# Patient Record
Sex: MALE | Race: WHITE | NOT HISPANIC OR LATINO | Employment: OTHER | URBAN - METROPOLITAN AREA
[De-identification: names, ages, dates, MRNs, and addresses within clinical notes are randomized per-mention and may not be internally consistent; named-entity substitution may affect disease eponyms.]

---

## 2018-04-03 ENCOUNTER — TELEPHONE (OUTPATIENT)
Dept: PRIMARY CARE CLINIC | Facility: CLINIC | Age: 63
End: 2018-04-03

## 2018-04-03 NOTE — TELEPHONE ENCOUNTER
----- Message from Dana Chaudhry sent at 4/3/2018  1:39 PM CDT -----  Patient is calling to get advice from doctor concerning a hernia that he has. Please call back to advise at [phone].

## 2019-08-29 ENCOUNTER — TELEPHONE (OUTPATIENT)
Dept: PRIMARY CARE CLINIC | Facility: CLINIC | Age: 64
End: 2019-08-29

## 2019-08-29 NOTE — TELEPHONE ENCOUNTER
----- Message from Erika Hernandez sent at 8/29/2019  9:51 AM CDT -----  Can we fit him in tomorrow with his wife  has appt at 215 has eye infection

## 2019-08-29 NOTE — TELEPHONE ENCOUNTER
Spoke to wife and explained that  is completely booked up on his schedule until next Tuesday. I offered an appt. Today or tomorrow on the nurse practitioners schedule but she stated the times available wouldn't work for her because she couldn't take off work that long. She will bring pt. To the urgent care.

## 2020-10-22 ENCOUNTER — TELEPHONE (OUTPATIENT)
Dept: PRIMARY CARE CLINIC | Facility: CLINIC | Age: 65
End: 2020-10-22

## 2020-10-22 NOTE — TELEPHONE ENCOUNTER
----- Message from Cele Barnhart sent at 10/22/2020  9:36 AM CDT -----  Contact: Spouse/Ofe [phone]  Patient is experiencing possible poison ivy. Requesting to speak with you.    Please call and advise.    Thank you      
----- Message from Praveena Espinoza sent at 10/22/2020  2:21 PM CDT -----  Pt would like call back regarding an appt over the phone     
Audio Visit tomorrow at 3:30 pm. Pt. spouse (Ofe) Confirmed apt.   
Spoke with Ofe bone. Spouse states pt. Was working in the garden yesterday and now has red, itchy spots all over his body. Requesting apt. For today unfortunately we do not have anything with Dr. Carter. Pt. States someone told her she had an apt. At 4 pm. I explained to her that no cheryl Mr. Elizabeth was not scheduled today with one of our providers. Scheduled Audio call for tomorrow at 3:30 pm. Per Dr. Carter states he will send in Medication to [facility].   
Universal Safety Interventions

## 2020-10-30 DIAGNOSIS — Z12.11 COLON CANCER SCREENING: ICD-10-CM

## 2021-05-20 ENCOUNTER — TELEPHONE (OUTPATIENT)
Dept: PRIMARY CARE CLINIC | Facility: CLINIC | Age: 66
End: 2021-05-20

## 2021-05-21 ENCOUNTER — OFFICE VISIT (OUTPATIENT)
Dept: PRIMARY CARE CLINIC | Facility: CLINIC | Age: 66
End: 2021-05-21
Payer: MEDICARE

## 2021-05-21 VITALS
BODY MASS INDEX: 26.29 KG/M2 | WEIGHT: 163.56 LBS | SYSTOLIC BLOOD PRESSURE: 118 MMHG | RESPIRATION RATE: 18 BRPM | HEART RATE: 61 BPM | HEIGHT: 66 IN | DIASTOLIC BLOOD PRESSURE: 66 MMHG | OXYGEN SATURATION: 95 % | TEMPERATURE: 98 F

## 2021-05-21 DIAGNOSIS — F17.210 NICOTINE DEPENDENCE, CIGARETTES, UNCOMPLICATED: ICD-10-CM

## 2021-05-21 DIAGNOSIS — M12.572 TRAUMATIC ARTHRITIS OF LEFT ANKLE: ICD-10-CM
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
## 2021-05-21 DIAGNOSIS — Z11.59 NEED FOR HEPATITIS C SCREENING TEST: ICD-10-CM

## 2021-05-21 DIAGNOSIS — R93.3 ABNORMAL COLONOSCOPY: ICD-10-CM

## 2021-05-21 DIAGNOSIS — Z76.89 ENCOUNTER TO ESTABLISH CARE: Primary | ICD-10-CM

## 2021-05-21 DIAGNOSIS — Z12.11 COLON CANCER SCREENING: ICD-10-CM

## 2021-05-21 DIAGNOSIS — Z13.6 ENCOUNTER FOR SCREENING FOR CARDIOVASCULAR DISORDERS: ICD-10-CM

## 2021-05-21 DIAGNOSIS — Z87.19 HISTORY OF RECTAL BLEEDING: ICD-10-CM

## 2021-05-21 DIAGNOSIS — S50.869A INSECT BITE OF FOREARM, UNSPECIFIED LATERALITY, INITIAL ENCOUNTER: ICD-10-CM

## 2021-05-21 PROCEDURE — 1101F PR PT FALLS ASSESS DOC 0-1 FALLS W/OUT INJ PAST YR: ICD-10-PCS | Mod: S$GLB,,, | Performed by: STUDENT IN AN ORGANIZED HEALTH CARE EDUCATION/TRAINING PROGRAM

## 2021-05-21 PROCEDURE — 99999 PR PBB SHADOW E&M-EST. PATIENT-LVL V: CPT | Mod: PBBFAC,,, | Performed by: STUDENT IN AN ORGANIZED HEALTH CARE EDUCATION/TRAINING PROGRAM

## 2021-05-21 PROCEDURE — 3288F FALL RISK ASSESSMENT DOCD: CPT | Mod: S$GLB,,, | Performed by: STUDENT IN AN ORGANIZED HEALTH CARE EDUCATION/TRAINING PROGRAM

## 2021-05-21 PROCEDURE — 1101F PT FALLS ASSESS-DOCD LE1/YR: CPT | Mod: S$GLB,,, | Performed by: STUDENT IN AN ORGANIZED HEALTH CARE EDUCATION/TRAINING PROGRAM

## 2021-05-21 PROCEDURE — 1126F AMNT PAIN NOTED NONE PRSNT: CPT | Mod: S$GLB,,, | Performed by: STUDENT IN AN ORGANIZED HEALTH CARE EDUCATION/TRAINING PROGRAM

## 2021-05-21 PROCEDURE — 1126F PR PAIN SEVERITY QUANTIFIED, NO PAIN PRESENT: ICD-10-PCS | Mod: S$GLB,,, | Performed by: STUDENT IN AN ORGANIZED HEALTH CARE EDUCATION/TRAINING PROGRAM

## 2021-05-21 PROCEDURE — 3008F PR BODY MASS INDEX (BMI) DOCUMENTED: ICD-10-PCS | Mod: S$GLB,,, | Performed by: STUDENT IN AN ORGANIZED HEALTH CARE EDUCATION/TRAINING PROGRAM

## 2021-05-21 PROCEDURE — 99999 PR PBB SHADOW E&M-EST. PATIENT-LVL V: ICD-10-PCS | Mod: PBBFAC,,, | Performed by: STUDENT IN AN ORGANIZED HEALTH CARE EDUCATION/TRAINING PROGRAM

## 2021-05-21 PROCEDURE — 99204 OFFICE O/P NEW MOD 45 MIN: CPT | Mod: S$GLB,,, | Performed by: STUDENT IN AN ORGANIZED HEALTH CARE EDUCATION/TRAINING PROGRAM

## 2021-05-21 PROCEDURE — 1159F PR MEDICATION LIST DOCUMENTED IN MEDICAL RECORD: ICD-10-PCS | Mod: S$GLB,,, | Performed by: STUDENT IN AN ORGANIZED HEALTH CARE EDUCATION/TRAINING PROGRAM

## 2021-05-21 PROCEDURE — 3008F BODY MASS INDEX DOCD: CPT | Mod: S$GLB,,, | Performed by: STUDENT IN AN ORGANIZED HEALTH CARE EDUCATION/TRAINING PROGRAM

## 2021-05-21 PROCEDURE — 99204 PR OFFICE/OUTPT VISIT, NEW, LEVL IV, 45-59 MIN: ICD-10-PCS | Mod: S$GLB,,, | Performed by: STUDENT IN AN ORGANIZED HEALTH CARE EDUCATION/TRAINING PROGRAM

## 2021-05-21 PROCEDURE — 1159F MED LIST DOCD IN RCRD: CPT | Mod: S$GLB,,, | Performed by: STUDENT IN AN ORGANIZED HEALTH CARE EDUCATION/TRAINING PROGRAM

## 2021-05-21 PROCEDURE — 3288F PR FALLS RISK ASSESSMENT DOCUMENTED: ICD-10-PCS | Mod: S$GLB,,, | Performed by: STUDENT IN AN ORGANIZED HEALTH CARE EDUCATION/TRAINING PROGRAM

## 2021-05-21 RX ORDER — HYDROCORTISONE 25 MG/G
CREAM TOPICAL 2 TIMES DAILY
Qty: 20 G | Refills: 3 | Status: SHIPPED | OUTPATIENT
Start: 2021-05-21 | End: 2022-01-18

## 2021-05-24 ENCOUNTER — TELEPHONE (OUTPATIENT)
Dept: SURGERY | Facility: CLINIC | Age: 66
End: 2021-05-24

## 2021-06-04 ENCOUNTER — TELEPHONE (OUTPATIENT)
Dept: ENDOSCOPY | Facility: HOSPITAL | Age: 66
End: 2021-06-04

## 2021-06-04 ENCOUNTER — TELEPHONE (OUTPATIENT)
Dept: PRIMARY CARE CLINIC | Facility: CLINIC | Age: 66
End: 2021-06-04

## 2021-06-04 ENCOUNTER — TELEPHONE (OUTPATIENT)
Dept: SURGERY | Facility: CLINIC | Age: 66
End: 2021-06-04

## 2021-06-04 ENCOUNTER — TELEPHONE (OUTPATIENT)
Dept: GASTROENTEROLOGY | Facility: CLINIC | Age: 66
End: 2021-06-04

## 2021-06-04 DIAGNOSIS — Z12.11 SCREENING FOR COLON CANCER: Primary | ICD-10-CM

## 2021-06-04 RX ORDER — SODIUM CHLORIDE, SODIUM LACTATE, POTASSIUM CHLORIDE, CALCIUM CHLORIDE 600; 310; 30; 20 MG/100ML; MG/100ML; MG/100ML; MG/100ML
INJECTION, SOLUTION INTRAVENOUS CONTINUOUS
Status: CANCELLED | OUTPATIENT
Start: 2021-06-04

## 2021-06-04 RX ORDER — SODIUM, POTASSIUM,MAG SULFATES 17.5-3.13G
1 SOLUTION, RECONSTITUTED, ORAL ORAL DAILY
Qty: 1 KIT | Refills: 0 | Status: SHIPPED | OUTPATIENT
Start: 2021-06-04 | End: 2021-06-06

## 2021-06-04 RX ORDER — SODIUM CHLORIDE 0.9 % (FLUSH) 0.9 %
3 SYRINGE (ML) INJECTION
Status: CANCELLED | OUTPATIENT
Start: 2021-06-04

## 2021-06-07 ENCOUNTER — TELEPHONE (OUTPATIENT)
Dept: ENDOSCOPY | Facility: HOSPITAL | Age: 66
End: 2021-06-07

## 2021-06-08 DIAGNOSIS — Z12.11 COLON CANCER SCREENING: Primary | ICD-10-CM

## 2021-06-14 ENCOUNTER — TELEPHONE (OUTPATIENT)
Dept: SURGERY | Facility: CLINIC | Age: 66
End: 2021-06-14

## 2021-07-06 ENCOUNTER — ANESTHESIA (OUTPATIENT)
Dept: ENDOSCOPY | Facility: HOSPITAL | Age: 66
End: 2021-07-06
Payer: MEDICARE

## 2021-07-06 ENCOUNTER — ANESTHESIA EVENT (OUTPATIENT)
Dept: ENDOSCOPY | Facility: HOSPITAL | Age: 66
End: 2021-07-06
Payer: MEDICARE

## 2021-07-06 ENCOUNTER — TELEPHONE (OUTPATIENT)
Dept: SURGERY | Facility: CLINIC | Age: 66
End: 2021-07-06

## 2021-07-06 ENCOUNTER — HOSPITAL ENCOUNTER (OUTPATIENT)
Facility: HOSPITAL | Age: 66
Discharge: HOME | End: 2021-07-06
Attending: INTERNAL MEDICINE | Admitting: INTERNAL MEDICINE
Payer: MEDICARE

## 2021-07-06 VITALS
HEIGHT: 66 IN | BODY MASS INDEX: 27 KG/M2 | HEART RATE: 58 BPM | WEIGHT: 168 LBS | SYSTOLIC BLOOD PRESSURE: 134 MMHG | OXYGEN SATURATION: 100 % | DIASTOLIC BLOOD PRESSURE: 75 MMHG | TEMPERATURE: 97 F | RESPIRATION RATE: 20 BRPM

## 2021-07-06 DIAGNOSIS — Z12.11 SCREENING FOR COLON CANCER: ICD-10-CM

## 2021-07-06 PROCEDURE — G0121 COLON CA SCRN NOT HI RSK IND: HCPCS | Mod: 74 | Performed by: INTERNAL MEDICINE

## 2021-07-06 PROCEDURE — 25000003 PHARM REV CODE 250: Performed by: NURSE ANESTHETIST, CERTIFIED REGISTERED

## 2021-07-06 PROCEDURE — 25000003 PHARM REV CODE 250: Performed by: INTERNAL MEDICINE

## 2021-07-06 PROCEDURE — G0121 COLON CA SCRN NOT HI RSK IND: ICD-10-PCS | Mod: 53,,, | Performed by: INTERNAL MEDICINE

## 2021-07-06 PROCEDURE — 37000008 HC ANESTHESIA 1ST 15 MINUTES: Performed by: INTERNAL MEDICINE

## 2021-07-06 PROCEDURE — 45378 DIAGNOSTIC COLONOSCOPY: CPT | Mod: 53 | Performed by: INTERNAL MEDICINE

## 2021-07-06 PROCEDURE — G0121 COLON CA SCRN NOT HI RSK IND: HCPCS | Mod: 53,,, | Performed by: INTERNAL MEDICINE

## 2021-07-06 PROCEDURE — 37000009 HC ANESTHESIA EA ADD 15 MINS: Performed by: INTERNAL MEDICINE

## 2021-07-06 PROCEDURE — 63600175 PHARM REV CODE 636 W HCPCS: Performed by: NURSE ANESTHETIST, CERTIFIED REGISTERED

## 2021-07-06 RX ORDER — SODIUM CHLORIDE 0.9 % (FLUSH) 0.9 %
3 SYRINGE (ML) INJECTION
Status: DISCONTINUED | OUTPATIENT
Start: 2021-07-06 | End: 2021-07-06 | Stop reason: HOSPADM

## 2021-07-06 RX ORDER — SODIUM CHLORIDE, SODIUM LACTATE, POTASSIUM CHLORIDE, CALCIUM CHLORIDE 600; 310; 30; 20 MG/100ML; MG/100ML; MG/100ML; MG/100ML
INJECTION, SOLUTION INTRAVENOUS CONTINUOUS
Status: DISCONTINUED | OUTPATIENT
Start: 2021-07-06 | End: 2021-07-06 | Stop reason: HOSPADM

## 2021-07-06 RX ORDER — PROPOFOL 10 MG/ML
VIAL (ML) INTRAVENOUS CONTINUOUS PRN
Status: DISCONTINUED | OUTPATIENT
Start: 2021-07-06 | End: 2021-07-06

## 2021-07-06 RX ORDER — PROPOFOL 10 MG/ML
VIAL (ML) INTRAVENOUS
Status: DISCONTINUED | OUTPATIENT
Start: 2021-07-06 | End: 2021-07-06

## 2021-07-06 RX ORDER — LIDOCAINE HYDROCHLORIDE 20 MG/ML
INJECTION INTRAVENOUS
Status: DISCONTINUED | OUTPATIENT
Start: 2021-07-06 | End: 2021-07-06

## 2021-07-06 RX ORDER — SODIUM CHLORIDE 9 MG/ML
INJECTION, SOLUTION INTRAVENOUS CONTINUOUS
Status: DISCONTINUED | OUTPATIENT
Start: 2021-07-06 | End: 2021-07-06 | Stop reason: HOSPADM

## 2021-07-06 RX ADMIN — LIDOCAINE HYDROCHLORIDE 30 MG: 20 INJECTION, SOLUTION INTRAVENOUS at 01:07

## 2021-07-06 RX ADMIN — PROPOFOL 150 MCG/KG/MIN: 10 INJECTION, EMULSION INTRAVENOUS at 01:07

## 2021-07-06 RX ADMIN — PROPOFOL 50 MG: 10 INJECTION, EMULSION INTRAVENOUS at 01:07

## 2021-07-06 RX ADMIN — SODIUM CHLORIDE: 0.9 INJECTION, SOLUTION INTRAVENOUS at 01:07

## 2021-07-07 ENCOUNTER — TELEPHONE (OUTPATIENT)
Dept: ENDOSCOPY | Facility: HOSPITAL | Age: 66
End: 2021-07-07

## 2022-01-18 ENCOUNTER — OFFICE VISIT (OUTPATIENT)
Dept: PRIMARY CARE CLINIC | Facility: CLINIC | Age: 67
End: 2022-01-18
Payer: MEDICARE

## 2022-01-18 VITALS
WEIGHT: 159.19 LBS | HEART RATE: 84 BPM | RESPIRATION RATE: 18 BRPM | HEIGHT: 66 IN | BODY MASS INDEX: 25.58 KG/M2 | SYSTOLIC BLOOD PRESSURE: 138 MMHG | OXYGEN SATURATION: 96 % | DIASTOLIC BLOOD PRESSURE: 60 MMHG | TEMPERATURE: 98 F

## 2022-01-18 DIAGNOSIS — G56.91 NEUROPATHY, ARM, RIGHT: Primary | ICD-10-CM

## 2022-01-18 DIAGNOSIS — R07.9 CHEST PAIN, UNSPECIFIED TYPE: ICD-10-CM

## 2022-01-18 PROCEDURE — 1100F PTFALLS ASSESS-DOCD GE2>/YR: CPT | Mod: CPTII,S$GLB,, | Performed by: STUDENT IN AN ORGANIZED HEALTH CARE EDUCATION/TRAINING PROGRAM

## 2022-01-18 PROCEDURE — 99214 PR OFFICE/OUTPT VISIT, EST, LEVL IV, 30-39 MIN: ICD-10-PCS | Mod: S$GLB,,, | Performed by: STUDENT IN AN ORGANIZED HEALTH CARE EDUCATION/TRAINING PROGRAM

## 2022-01-18 PROCEDURE — 93005 EKG 12-LEAD: ICD-10-PCS | Mod: S$GLB,,, | Performed by: STUDENT IN AN ORGANIZED HEALTH CARE EDUCATION/TRAINING PROGRAM

## 2022-01-18 PROCEDURE — 3288F FALL RISK ASSESSMENT DOCD: CPT | Mod: CPTII,S$GLB,, | Performed by: STUDENT IN AN ORGANIZED HEALTH CARE EDUCATION/TRAINING PROGRAM

## 2022-01-18 PROCEDURE — 1159F MED LIST DOCD IN RCRD: CPT | Mod: CPTII,S$GLB,, | Performed by: STUDENT IN AN ORGANIZED HEALTH CARE EDUCATION/TRAINING PROGRAM

## 2022-01-18 PROCEDURE — 93010 ELECTROCARDIOGRAM REPORT: CPT | Mod: S$GLB,,, | Performed by: INTERNAL MEDICINE

## 2022-01-18 PROCEDURE — 3008F BODY MASS INDEX DOCD: CPT | Mod: CPTII,S$GLB,, | Performed by: STUDENT IN AN ORGANIZED HEALTH CARE EDUCATION/TRAINING PROGRAM

## 2022-01-18 PROCEDURE — 99214 OFFICE O/P EST MOD 30 MIN: CPT | Mod: S$GLB,,, | Performed by: STUDENT IN AN ORGANIZED HEALTH CARE EDUCATION/TRAINING PROGRAM

## 2022-01-18 PROCEDURE — 3075F PR MOST RECENT SYSTOLIC BLOOD PRESS GE 130-139MM HG: ICD-10-PCS | Mod: CPTII,S$GLB,, | Performed by: STUDENT IN AN ORGANIZED HEALTH CARE EDUCATION/TRAINING PROGRAM

## 2022-01-18 PROCEDURE — 1159F PR MEDICATION LIST DOCUMENTED IN MEDICAL RECORD: ICD-10-PCS | Mod: CPTII,S$GLB,, | Performed by: STUDENT IN AN ORGANIZED HEALTH CARE EDUCATION/TRAINING PROGRAM

## 2022-01-18 PROCEDURE — 3078F PR MOST RECENT DIASTOLIC BLOOD PRESSURE < 80 MM HG: ICD-10-PCS | Mod: CPTII,S$GLB,, | Performed by: STUDENT IN AN ORGANIZED HEALTH CARE EDUCATION/TRAINING PROGRAM

## 2022-01-18 PROCEDURE — 93010 EKG 12-LEAD: ICD-10-PCS | Mod: S$GLB,,, | Performed by: INTERNAL MEDICINE

## 2022-01-18 PROCEDURE — 3288F PR FALLS RISK ASSESSMENT DOCUMENTED: ICD-10-PCS | Mod: CPTII,S$GLB,, | Performed by: STUDENT IN AN ORGANIZED HEALTH CARE EDUCATION/TRAINING PROGRAM

## 2022-01-18 PROCEDURE — 3008F PR BODY MASS INDEX (BMI) DOCUMENTED: ICD-10-PCS | Mod: CPTII,S$GLB,, | Performed by: STUDENT IN AN ORGANIZED HEALTH CARE EDUCATION/TRAINING PROGRAM

## 2022-01-18 PROCEDURE — 1100F PR PT FALLS ASSESS DOC 2+ FALLS/FALL W/INJURY/YR: ICD-10-PCS | Mod: CPTII,S$GLB,, | Performed by: STUDENT IN AN ORGANIZED HEALTH CARE EDUCATION/TRAINING PROGRAM

## 2022-01-18 PROCEDURE — 3075F SYST BP GE 130 - 139MM HG: CPT | Mod: CPTII,S$GLB,, | Performed by: STUDENT IN AN ORGANIZED HEALTH CARE EDUCATION/TRAINING PROGRAM

## 2022-01-18 PROCEDURE — 99999 PR PBB SHADOW E&M-EST. PATIENT-LVL III: ICD-10-PCS | Mod: PBBFAC,,, | Performed by: STUDENT IN AN ORGANIZED HEALTH CARE EDUCATION/TRAINING PROGRAM

## 2022-01-18 PROCEDURE — 3078F DIAST BP <80 MM HG: CPT | Mod: CPTII,S$GLB,, | Performed by: STUDENT IN AN ORGANIZED HEALTH CARE EDUCATION/TRAINING PROGRAM

## 2022-01-18 PROCEDURE — 1125F AMNT PAIN NOTED PAIN PRSNT: CPT | Mod: CPTII,S$GLB,, | Performed by: STUDENT IN AN ORGANIZED HEALTH CARE EDUCATION/TRAINING PROGRAM

## 2022-01-18 PROCEDURE — 1160F RVW MEDS BY RX/DR IN RCRD: CPT | Mod: CPTII,S$GLB,, | Performed by: STUDENT IN AN ORGANIZED HEALTH CARE EDUCATION/TRAINING PROGRAM

## 2022-01-18 PROCEDURE — 1125F PR PAIN SEVERITY QUANTIFIED, PAIN PRESENT: ICD-10-PCS | Mod: CPTII,S$GLB,, | Performed by: STUDENT IN AN ORGANIZED HEALTH CARE EDUCATION/TRAINING PROGRAM

## 2022-01-18 PROCEDURE — 99999 PR PBB SHADOW E&M-EST. PATIENT-LVL III: CPT | Mod: PBBFAC,,, | Performed by: STUDENT IN AN ORGANIZED HEALTH CARE EDUCATION/TRAINING PROGRAM

## 2022-01-18 PROCEDURE — 1160F PR REVIEW ALL MEDS BY PRESCRIBER/CLIN PHARMACIST DOCUMENTED: ICD-10-PCS | Mod: CPTII,S$GLB,, | Performed by: STUDENT IN AN ORGANIZED HEALTH CARE EDUCATION/TRAINING PROGRAM

## 2022-01-18 PROCEDURE — 93005 ELECTROCARDIOGRAM TRACING: CPT | Mod: S$GLB,,, | Performed by: STUDENT IN AN ORGANIZED HEALTH CARE EDUCATION/TRAINING PROGRAM

## 2022-01-18 NOTE — PROGRESS NOTES
"Subjective:       Patient ID: Manoj Elizabeth is a 66 y.o. male.    Chief Complaint: Tingling (In right arm )      HPI:  66 y.o. male presents to [facility] with complaints of chest discomfort.    Patient reports he has been experiencing tingling in his right arm since catching a fever about 1 week ago. Broke into cold sweat came on and broke. Does feel sudden short pain in chest that is brief, and will resolve quickly. Has occurred about 12 times since onset. Pain in chest can occur at any time. Numbness in hand is always present. No weakness in hand.     Dog bit his right calf about 2 weeks ago. Now completely healed, no drainage or erythema. Does feel soreness in his chest at this time. Not worse with deep breaths or pressing on region of aching.    No history of MI.    Review of Systems   Constitutional: Positive for diaphoresis (1 week ago) and fever (1 week ago prior to onset). Negative for chills and fatigue.   HENT: Negative for congestion, sinus pressure, sneezing and sore throat.    Respiratory: Negative for cough and shortness of breath.    Cardiovascular: Positive for chest pain. Negative for palpitations.   Gastrointestinal: Negative for abdominal pain, diarrhea, nausea and vomiting.   Skin: Negative for rash and wound.   Neurological: Positive for tremors and numbness (numbness in right shoulder and finger tips). Negative for dizziness, speech difficulty and weakness.       Objective:      Vitals:    01/18/22 0943   BP: 138/60   BP Location: Right arm   Patient Position: Sitting   BP Method: Medium (Manual)   Pulse: 84   Resp: 18   Temp: 97.9 °F (36.6 °C)   TempSrc: Oral   SpO2: 96%   Weight: 72.2 kg (159 lb 2.8 oz)   Height: 5' 6" (1.676 m)     Physical Exam  Vitals reviewed.   Constitutional:       General: He is not in acute distress.     Appearance: Normal appearance. He is not ill-appearing.   HENT:      Head: Normocephalic and atraumatic.   Eyes:      General:         Right eye: No discharge. "         Left eye: No discharge.      Conjunctiva/sclera: Conjunctivae normal.   Cardiovascular:      Rate and Rhythm: Normal rate and regular rhythm.      Pulses: Normal pulses.      Heart sounds: Normal heart sounds.   Pulmonary:      Effort: Pulmonary effort is normal.      Breath sounds: Normal breath sounds.   Musculoskeletal:         General: No deformity.   Skin:     General: Skin is warm and dry.      Coloration: Skin is not jaundiced.   Neurological:      General: No focal deficit present.      Mental Status: He is alert and oriented to person, place, and time.   Psychiatric:         Mood and Affect: Mood normal.         Behavior: Behavior normal.             No results found for: NA, K, CL, CO2, BUN, CREATININE, GLUCOSE, ANIONGAP  No results found for: HGBA1C  No results found for: BNP, BNPTRIAGEBLO    Lab Results   Component Value Date    WBC 6.60 05/22/2021    HGB 13.6 (L) 05/22/2021    HCT 40.7 05/22/2021     05/22/2021    GRAN 2.8 05/22/2021    GRAN 42.1 05/22/2021     Lab Results   Component Value Date    CHOL 157 05/22/2021    HDL 70 05/22/2021    LDLCALC 75.4 05/22/2021    TRIG 58 05/22/2021          Current Outpatient Medications:     celecoxib (CELEBREX) 200 MG capsule, Take 1 capsule (200 mg total) by mouth 2 (two) times daily as needed for Pain. (Patient not taking: Reported on 1/18/2022), Disp: 20 capsule, Rfl: 0        Assessment:       1. Neuropathy, arm, right    2. Chest pain, unspecified type           Plan:       Neuropathy, arm, right  Chest pain, unspecified type  -     X-Ray Chest PA And Lateral; Future; Expected date: 01/18/2022  -     CBC Auto Differential; Future; Expected date: 01/18/2022  -     Comprehensive Metabolic Panel; Future; Expected date: 01/18/2022  -     EKG 12-lead  -     Patient directed to ED for ACS rule out at this time. Recommend decreased alcohol intake to discontinuation. Informed patient of risks associated with high alcohol intake including, but not  limited to MI, dilated cardiomyopathy    Patient escorted to ED

## 2022-01-25 ENCOUNTER — TELEPHONE (OUTPATIENT)
Dept: PRIMARY CARE CLINIC | Facility: CLINIC | Age: 67
End: 2022-01-25
Payer: MEDICARE

## 2022-01-25 NOTE — TELEPHONE ENCOUNTER
----- Message from Vane Osborn sent at 1/25/2022 12:06 PM CST -----  Contact: [phone] Ofe (wife)  Patient's wife would like to speak to the nurse to discuss patient's health issues. Please advise

## 2022-01-28 ENCOUNTER — OFFICE VISIT (OUTPATIENT)
Dept: PRIMARY CARE CLINIC | Facility: CLINIC | Age: 67
End: 2022-01-28
Payer: MEDICARE

## 2022-01-28 VITALS
WEIGHT: 158.94 LBS | BODY MASS INDEX: 25.54 KG/M2 | HEIGHT: 66 IN | DIASTOLIC BLOOD PRESSURE: 70 MMHG | RESPIRATION RATE: 18 BRPM | HEART RATE: 67 BPM | SYSTOLIC BLOOD PRESSURE: 136 MMHG | TEMPERATURE: 98 F

## 2022-01-28 DIAGNOSIS — M25.511 ACUTE PAIN OF RIGHT SHOULDER: ICD-10-CM

## 2022-01-28 DIAGNOSIS — F17.200 SMOKER: ICD-10-CM

## 2022-01-28 DIAGNOSIS — F10.90 HEAVY ALCOHOL USE: ICD-10-CM

## 2022-01-28 DIAGNOSIS — R91.8 MULTIPLE PULMONARY NODULES: ICD-10-CM

## 2022-01-28 DIAGNOSIS — R07.9 CHEST PAIN, UNSPECIFIED TYPE: Primary | ICD-10-CM

## 2022-01-28 PROCEDURE — 99999 PR PBB SHADOW E&M-EST. PATIENT-LVL V: ICD-10-PCS | Mod: PBBFAC,,, | Performed by: STUDENT IN AN ORGANIZED HEALTH CARE EDUCATION/TRAINING PROGRAM

## 2022-01-28 PROCEDURE — 3078F DIAST BP <80 MM HG: CPT | Mod: CPTII,S$GLB,, | Performed by: STUDENT IN AN ORGANIZED HEALTH CARE EDUCATION/TRAINING PROGRAM

## 2022-01-28 PROCEDURE — 1160F PR REVIEW ALL MEDS BY PRESCRIBER/CLIN PHARMACIST DOCUMENTED: ICD-10-PCS | Mod: CPTII,S$GLB,, | Performed by: STUDENT IN AN ORGANIZED HEALTH CARE EDUCATION/TRAINING PROGRAM

## 2022-01-28 PROCEDURE — 3288F PR FALLS RISK ASSESSMENT DOCUMENTED: ICD-10-PCS | Mod: CPTII,S$GLB,, | Performed by: STUDENT IN AN ORGANIZED HEALTH CARE EDUCATION/TRAINING PROGRAM

## 2022-01-28 PROCEDURE — 99214 OFFICE O/P EST MOD 30 MIN: CPT | Mod: S$GLB,,, | Performed by: STUDENT IN AN ORGANIZED HEALTH CARE EDUCATION/TRAINING PROGRAM

## 2022-01-28 PROCEDURE — 1101F PR PT FALLS ASSESS DOC 0-1 FALLS W/OUT INJ PAST YR: ICD-10-PCS | Mod: CPTII,S$GLB,, | Performed by: STUDENT IN AN ORGANIZED HEALTH CARE EDUCATION/TRAINING PROGRAM

## 2022-01-28 PROCEDURE — 1159F PR MEDICATION LIST DOCUMENTED IN MEDICAL RECORD: ICD-10-PCS | Mod: CPTII,S$GLB,, | Performed by: STUDENT IN AN ORGANIZED HEALTH CARE EDUCATION/TRAINING PROGRAM

## 2022-01-28 PROCEDURE — 1125F AMNT PAIN NOTED PAIN PRSNT: CPT | Mod: CPTII,S$GLB,, | Performed by: STUDENT IN AN ORGANIZED HEALTH CARE EDUCATION/TRAINING PROGRAM

## 2022-01-28 PROCEDURE — 1160F RVW MEDS BY RX/DR IN RCRD: CPT | Mod: CPTII,S$GLB,, | Performed by: STUDENT IN AN ORGANIZED HEALTH CARE EDUCATION/TRAINING PROGRAM

## 2022-01-28 PROCEDURE — 99999 PR PBB SHADOW E&M-EST. PATIENT-LVL V: CPT | Mod: PBBFAC,,, | Performed by: STUDENT IN AN ORGANIZED HEALTH CARE EDUCATION/TRAINING PROGRAM

## 2022-01-28 PROCEDURE — 1125F PR PAIN SEVERITY QUANTIFIED, PAIN PRESENT: ICD-10-PCS | Mod: CPTII,S$GLB,, | Performed by: STUDENT IN AN ORGANIZED HEALTH CARE EDUCATION/TRAINING PROGRAM

## 2022-01-28 PROCEDURE — 3008F BODY MASS INDEX DOCD: CPT | Mod: CPTII,S$GLB,, | Performed by: STUDENT IN AN ORGANIZED HEALTH CARE EDUCATION/TRAINING PROGRAM

## 2022-01-28 PROCEDURE — 1101F PT FALLS ASSESS-DOCD LE1/YR: CPT | Mod: CPTII,S$GLB,, | Performed by: STUDENT IN AN ORGANIZED HEALTH CARE EDUCATION/TRAINING PROGRAM

## 2022-01-28 PROCEDURE — 99214 PR OFFICE/OUTPT VISIT, EST, LEVL IV, 30-39 MIN: ICD-10-PCS | Mod: S$GLB,,, | Performed by: STUDENT IN AN ORGANIZED HEALTH CARE EDUCATION/TRAINING PROGRAM

## 2022-01-28 PROCEDURE — 3078F PR MOST RECENT DIASTOLIC BLOOD PRESSURE < 80 MM HG: ICD-10-PCS | Mod: CPTII,S$GLB,, | Performed by: STUDENT IN AN ORGANIZED HEALTH CARE EDUCATION/TRAINING PROGRAM

## 2022-01-28 PROCEDURE — 3075F PR MOST RECENT SYSTOLIC BLOOD PRESS GE 130-139MM HG: ICD-10-PCS | Mod: CPTII,S$GLB,, | Performed by: STUDENT IN AN ORGANIZED HEALTH CARE EDUCATION/TRAINING PROGRAM

## 2022-01-28 PROCEDURE — 1159F MED LIST DOCD IN RCRD: CPT | Mod: CPTII,S$GLB,, | Performed by: STUDENT IN AN ORGANIZED HEALTH CARE EDUCATION/TRAINING PROGRAM

## 2022-01-28 PROCEDURE — 3288F FALL RISK ASSESSMENT DOCD: CPT | Mod: CPTII,S$GLB,, | Performed by: STUDENT IN AN ORGANIZED HEALTH CARE EDUCATION/TRAINING PROGRAM

## 2022-01-28 PROCEDURE — 3008F PR BODY MASS INDEX (BMI) DOCUMENTED: ICD-10-PCS | Mod: CPTII,S$GLB,, | Performed by: STUDENT IN AN ORGANIZED HEALTH CARE EDUCATION/TRAINING PROGRAM

## 2022-01-28 PROCEDURE — 3075F SYST BP GE 130 - 139MM HG: CPT | Mod: CPTII,S$GLB,, | Performed by: STUDENT IN AN ORGANIZED HEALTH CARE EDUCATION/TRAINING PROGRAM

## 2022-01-28 RX ORDER — ASPIRIN 81 MG/1
81 TABLET ORAL DAILY
COMMUNITY
End: 2023-01-10

## 2022-01-28 RX ORDER — DICLOFENAC SODIUM 50 MG/1
50 TABLET, DELAYED RELEASE ORAL 2 TIMES DAILY PRN
Qty: 60 TABLET | Refills: 1 | Status: SHIPPED | OUTPATIENT
Start: 2022-01-28 | End: 2022-12-28 | Stop reason: SDUPTHER

## 2022-01-28 NOTE — PROGRESS NOTES
"Subjective:       Patient ID: Manoj Elizabeth is a 66 y.o. male.    Chief Complaint: Follow-up      HPI:  66 y.o. male presents to [facility] for follow-up of chest pain.    Patient was seen in ED for ACS rule out 1/18/2022.   EKG at that time NSR, HR 68, QTc 410, no ST change. CXR without acute finding, and troponin was negative. CT head for left arm numbness did not reveal any acute findings.    Today reports symptoms are persistent at this time. Feels as if there is a knife in his chest that is just sitting there. Radiates doen his right arm. Waxes and wanes in intensity. Aspirin can help settle down some, but never completely resolves. Pain improves when he is active. Somewhat worse with deep breaths.    No history of MI, renal disease, or gastric ulcer    Review of Systems   Constitutional: Negative for chills, diaphoresis, fatigue and fever.   HENT: Positive for rhinorrhea (Unchanged, has been going on for years). Negative for congestion, sinus pressure, sneezing and sore throat.    Respiratory: Negative for cough and shortness of breath.    Cardiovascular: Positive for chest pain (radiating into right arm). Negative for palpitations.   Gastrointestinal: Negative for abdominal pain, diarrhea, nausea and vomiting.   Skin: Negative for rash and wound.   Neurological: Negative for dizziness, weakness, light-headedness and headaches.       Objective:      Vitals:    01/28/22 0936   BP: 136/70   BP Location: Right arm   Patient Position: Sitting   BP Method: Medium (Manual)   Pulse: 67   Resp: 18   Temp: 97.7 °F (36.5 °C)   TempSrc: Oral   Weight: 72.1 kg (158 lb 15.2 oz)   Height: 5' 6" (1.676 m)     Physical Exam  Vitals reviewed.   Constitutional:       General: He is not in acute distress.     Appearance: Normal appearance. He is not ill-appearing.   HENT:      Head: Normocephalic and atraumatic.   Eyes:      General:         Right eye: No discharge.         Left eye: No discharge.      Conjunctiva/sclera: " Conjunctivae normal.   Cardiovascular:      Rate and Rhythm: Normal rate and regular rhythm.      Pulses: Normal pulses.      Heart sounds: Normal heart sounds.   Pulmonary:      Effort: Pulmonary effort is normal.      Breath sounds: Normal breath sounds.   Musculoskeletal:         General: No deformity.   Skin:     General: Skin is warm and dry.      Coloration: Skin is not jaundiced.   Neurological:      General: No focal deficit present.      Mental Status: He is alert and oriented to person, place, and time.   Psychiatric:         Mood and Affect: Mood normal.         Behavior: Behavior normal.             Lab Results   Component Value Date     01/18/2022    K 4.4 01/18/2022     01/18/2022    CO2 22 (L) 01/18/2022    BUN 10 01/18/2022    CREATININE 0.8 01/18/2022    ANIONGAP 13 01/18/2022     No results found for: HGBA1C  Lab Results   Component Value Date    BNP <10 01/18/2022       Lab Results   Component Value Date    WBC 9.69 01/18/2022    HGB 14.5 01/18/2022    HCT 44.7 01/18/2022     01/18/2022    GRAN 6.8 01/18/2022    GRAN 70.4 01/18/2022     Lab Results   Component Value Date    CHOL 157 05/22/2021    HDL 70 05/22/2021    LDLCALC 75.4 05/22/2021    TRIG 58 05/22/2021          Current Outpatient Medications:     aspirin (ECOTRIN) 81 MG EC tablet, Take 81 mg by mouth once daily., Disp: , Rfl:     diclofenac (VOLTAREN) 50 MG EC tablet, Take 1 tablet (50 mg total) by mouth 2 (two) times daily as needed (chest pain)., Disp: 60 tablet, Rfl: 1        Assessment:       1. Chest pain, unspecified type    2. Smoker    3. Heavy alcohol use    4. Acute pain of right shoulder    5. Solitary pulmonary nodule           Plan:       Chest pain, unspecified type  Smoker  Heavy alcohol use  Acute pain of right shoulder  Multiple pulmonary nodule  -     US Abdomen Limited; Future; Expected date: 01/29/2022  -     CT Chest Without Contrast; Future; Expected date: 01/28/2022  -     US Abdomen Limited;  Future; Expected date: 01/29/2022  -     Ambulatory referral/consult to Cardiology; Future; Expected date: 02/04/2022  -     diclofenac (VOLTAREN) 50 MG EC tablet; Take 1 tablet (50 mg total) by mouth 2 (two) times daily as needed (chest pain).  Dispense: 60 tablet; Refill: 1   - With abnormal low dose CT chest 5/2021 and symptoms at this time, will order CT chest without contrast to further evalaute  - With heavy alcohol use will order imaging to evaluate for possible referred pain to shoulder  - Will have Cardiology evalaute for utility of further Cardiac work-up    RTC in 4 weeks

## 2022-01-28 NOTE — PATIENT INSTRUCTIONS
Patient Education       Chest Pain   About this topic   Chest pain is felt in the upper part of your body from your neck to your belly. You may feel pain, pressure, or tightness. Many things can cause chest pain. Some are serious things like heart disease or lung disease. Less serious things like stomach problems can also cause chest pain.  The doctors may not be able to find all serious causes of chest pain the first time they see you. It is important that you follow up with your doctor. Treatment will depend on what is causing your chest pain.  What are the causes?   Some of the problems related to your heart include:  · Heart attack. This is a blockage of blood supply to part of the heart.  · Angina. This is similar to a heart attack, but without long-lasting heart damage.       · Arrhythmia. This is abnormal heartbeats.  · Pericarditis. This is irritation of the sac around the heart.  Some of the problems that may not be related to your heart include:  · Digestive problems like ulcers, indigestion, gastric reflux, gallstones  · Lung problems like collapsed lung, blood clots, asthma, pneumonia  · Rib injuries or muscle pain  · Panic attack  · Pinched nerve  · Shingles  What are the main signs?   · Chest pain of any type should be checked by a doctor.  · Signs that may show a more serious cause of chest pain are:  ? Squeezing or tightness in the chest which may spread to the back, neck, jaw, shoulders, or arms  ? Shortness of breath  ? Sweating  ? Dizziness  ? Upset stomach, nausea, or throwing up  ? Weakness  ? Feeling faint  · If you have chest pain with any of these signs, call for emergency help.  How does the doctor diagnose this health problem?   Your doctor will do an exam. The doctor may ask you questions about your pain. Your doctor may order:  · Lab tests  · Chest x-ray  · Electrocardiogram (ECG)     · Echocardiogram  · Stress test  · Nuclear heart scanning  · Computed tomography (CT)  · Heart cath or  catheterization  How does the doctor treat this health problem?   Your treatment will depend on what is causing the pain. Many times, drugs may be given to treat the problem. Any chest pain could be serious. More serious problems can be treated by opening the vessel in your heart with a balloon or a metal straw called a stent. You may need surgery to replace the vessels in your heart. Always talk to your doctor about chest pain.  What lifestyle changes are needed?   Once your doctor finds the cause of your chest pain, you may be asked to make changes to your diet, activity, weight, and drugs you take. These changes will depend on the cause of your pain.  What drugs may be needed?   If chest pain is caused by a heart-related problem, the doctor may order drugs to:  · Thin the blood  · Dissolve a blood clot  · Lower cholesterol  · Lessen the work of your heart  · Correct or prevent an abnormal heartbeat  · Lower your blood pressure  · Increase blood flow to the heart muscle  · Relax the heart and help avoid spasms in the arteries  Check with your doctor before you take drugs like ibuprofen, naproxen, vitamins, or hormone replacement therapy.  If chest pain is caused by a something other than your heart, the doctor may order drugs to:  · Help with pain  · Treat stomach problems  · Help with breathing  · Help you relax  · Control coughing  What problems could happen?   If the pain is due to a serious problem with the heart or lungs, the following things could happen:  · Heart attack with long-lasting damage to the heart  · Abnormal heartbeat that is too fast, too slow, or irregular  · The heart stops beating. This is cardiac arrest. If not promptly treated, it can result in death.  What can be done to prevent this health problem?   · If you smoke, stop.  · Keep a healthy weight. If you are too heavy, lose weight.  · Keep blood pressure, cholesterol, and high blood sugar (diabetes) under control.  · Be active. Walk,  garden, or do something active for 30 minutes or more on most days of the week.  · Eat lots of fiber, fruits, starches, and vegetables and stay away from foods that are high in fats.  When do I need to call the doctor?   Activate the emergency medical system right away if you have signs of a heart attack. Call 911 in the [place] or [place]. The sooner treatment begins, the better your chances for recovery. Call for emergency help right away if you have:  · Signs of heart attack:  ? Chest pain  ? Trouble breathing  ? Fast heartbeat  ? Feeling dizzy  · Not had chest pain before and it does not go away with rest after 5 minutes. Do not drive yourself to the hospital or have someone drive you. The emergency rescue people can begin to treat you the minute they arrive.       If you are to take nitroglycerin pills or spray with chest pain:  · Rest. Sit or lie down.  · Spray or place one pill under your tongue and let it melt.  · If the pain is not better or is getting worse after 5 minutes, call for emergency help. Then take one more spray or pill under your tongue.  · If the pain does not get better after another 3 to 5 minutes, take a third spray or pill under your tongue.  · Drink a sip of water to wet your mouth if it is too dry to let the pills break down.  Where can I learn more?   American Heart Association  http://www.heart.org/HEARTORG/Conditions/HeartAttack/AboutHeartAttacks/About-Heart-Attacks_UCM_002038_Article.jsp   American Heart Association  http://www.heart.org/HEARTORG/Conditions/HeartAttack/SymptomsDiagnosisofHeartAttack/Angina-Chest-Pain_UCM_450308_Article.jsp   FamilyDoctor.org  http://familydoctor.org/familydoctor/en/diseases-conditions/angina.printerview.all.html   NHS Choices  https://www.nhs.uk/conditions/chest-pain/   Last Reviewed Date   2021-06-21  Consumer Information Use and Disclaimer   This information is not specific medical advice and does not replace information you receive from your  health care provider. This is only a brief summary of general information. It does NOT include all information about conditions, illnesses, injuries, tests, procedures, treatments, therapies, discharge instructions or life-style choices that may apply to you. You must talk with your health care provider for complete information about your health and treatment options. This information should not be used to decide whether or not to accept your health care providers advice, instructions or recommendations. Only your health care provider has the knowledge and training to provide advice that is right for you.  Copyright   Copyright © 2021 [employer] and its affiliates and/or licensors. All rights reserved.

## 2022-01-31 ENCOUNTER — OFFICE VISIT (OUTPATIENT)
Dept: CARDIOLOGY | Facility: CLINIC | Age: 67
End: 2022-01-31
Payer: MEDICARE

## 2022-01-31 VITALS
OXYGEN SATURATION: 99 % | WEIGHT: 154.13 LBS | DIASTOLIC BLOOD PRESSURE: 64 MMHG | HEART RATE: 69 BPM | BODY MASS INDEX: 26.31 KG/M2 | SYSTOLIC BLOOD PRESSURE: 124 MMHG | HEIGHT: 64 IN

## 2022-01-31 DIAGNOSIS — R07.89 OTHER CHEST PAIN: ICD-10-CM

## 2022-01-31 DIAGNOSIS — R07.9 CHEST PAIN, UNSPECIFIED TYPE: ICD-10-CM

## 2022-01-31 PROCEDURE — 3008F BODY MASS INDEX DOCD: CPT | Mod: CPTII,S$GLB,, | Performed by: NURSE PRACTITIONER

## 2022-01-31 PROCEDURE — 3074F SYST BP LT 130 MM HG: CPT | Mod: CPTII,S$GLB,, | Performed by: NURSE PRACTITIONER

## 2022-01-31 PROCEDURE — 1160F RVW MEDS BY RX/DR IN RCRD: CPT | Mod: CPTII,S$GLB,, | Performed by: NURSE PRACTITIONER

## 2022-01-31 PROCEDURE — 1159F PR MEDICATION LIST DOCUMENTED IN MEDICAL RECORD: ICD-10-PCS | Mod: CPTII,S$GLB,, | Performed by: NURSE PRACTITIONER

## 2022-01-31 PROCEDURE — 99999 PR PBB SHADOW E&M-EST. PATIENT-LVL IV: CPT | Mod: PBBFAC,,, | Performed by: NURSE PRACTITIONER

## 2022-01-31 PROCEDURE — 1125F PR PAIN SEVERITY QUANTIFIED, PAIN PRESENT: ICD-10-PCS | Mod: CPTII,S$GLB,, | Performed by: NURSE PRACTITIONER

## 2022-01-31 PROCEDURE — 1101F PR PT FALLS ASSESS DOC 0-1 FALLS W/OUT INJ PAST YR: ICD-10-PCS | Mod: CPTII,S$GLB,, | Performed by: NURSE PRACTITIONER

## 2022-01-31 PROCEDURE — 99203 PR OFFICE/OUTPT VISIT, NEW, LEVL III, 30-44 MIN: ICD-10-PCS | Mod: S$GLB,,, | Performed by: NURSE PRACTITIONER

## 2022-01-31 PROCEDURE — 99203 OFFICE O/P NEW LOW 30 MIN: CPT | Mod: S$GLB,,, | Performed by: NURSE PRACTITIONER

## 2022-01-31 PROCEDURE — 3288F PR FALLS RISK ASSESSMENT DOCUMENTED: ICD-10-PCS | Mod: CPTII,S$GLB,, | Performed by: NURSE PRACTITIONER

## 2022-01-31 PROCEDURE — 1159F MED LIST DOCD IN RCRD: CPT | Mod: CPTII,S$GLB,, | Performed by: NURSE PRACTITIONER

## 2022-01-31 PROCEDURE — 1125F AMNT PAIN NOTED PAIN PRSNT: CPT | Mod: CPTII,S$GLB,, | Performed by: NURSE PRACTITIONER

## 2022-01-31 PROCEDURE — 1160F PR REVIEW ALL MEDS BY PRESCRIBER/CLIN PHARMACIST DOCUMENTED: ICD-10-PCS | Mod: CPTII,S$GLB,, | Performed by: NURSE PRACTITIONER

## 2022-01-31 PROCEDURE — 3074F PR MOST RECENT SYSTOLIC BLOOD PRESSURE < 130 MM HG: ICD-10-PCS | Mod: CPTII,S$GLB,, | Performed by: NURSE PRACTITIONER

## 2022-01-31 PROCEDURE — 3078F PR MOST RECENT DIASTOLIC BLOOD PRESSURE < 80 MM HG: ICD-10-PCS | Mod: CPTII,S$GLB,, | Performed by: NURSE PRACTITIONER

## 2022-01-31 PROCEDURE — 1101F PT FALLS ASSESS-DOCD LE1/YR: CPT | Mod: CPTII,S$GLB,, | Performed by: NURSE PRACTITIONER

## 2022-01-31 PROCEDURE — 99999 PR PBB SHADOW E&M-EST. PATIENT-LVL IV: ICD-10-PCS | Mod: PBBFAC,,, | Performed by: NURSE PRACTITIONER

## 2022-01-31 PROCEDURE — 3288F FALL RISK ASSESSMENT DOCD: CPT | Mod: CPTII,S$GLB,, | Performed by: NURSE PRACTITIONER

## 2022-01-31 PROCEDURE — 3078F DIAST BP <80 MM HG: CPT | Mod: CPTII,S$GLB,, | Performed by: NURSE PRACTITIONER

## 2022-01-31 PROCEDURE — 3008F PR BODY MASS INDEX (BMI) DOCUMENTED: ICD-10-PCS | Mod: CPTII,S$GLB,, | Performed by: NURSE PRACTITIONER

## 2022-01-31 NOTE — PATIENT INSTRUCTIONS
We will get you set up for a stress test and an echocardiogram    I think we may need to get you to see an orthopedist

## 2022-01-31 NOTE — PROGRESS NOTES
Cardiology    1/31/2022  8:45 AM    Problem list  There is no problem list on file for this patient.      CC:  Chest pain    HPI:  A couple of weeks ago went downtown and got the COVID and then the pain started.  No nausea or vomiting, no diaphoresis  No PMH heart dx,  Father and brother had open heart surgery   Brother was in his 60s, father around the same time.  Right sided chest pain that is reproducible and moves into arm.  Pain in chest is a steady pain that is not drastic or anything.  The pain is more drastic in arm than chest.  Never had pain like this before  Has been working on a ceiling for over a week    Started smoking at age 13, smokes about 3/4-1 pack daily.   Alcohol use: drinks 1/2 pint to a pint dailt      Stopped drinking a week ago, no change in how he feels  No renal probs or diabetes  Pain is constant  Wife present in clinic and assists with ROS/HPI    Medications  Current Outpatient Medications   Medication Sig Dispense Refill    aspirin (ECOTRIN) 81 MG EC tablet Take 81 mg by mouth once daily.      diclofenac (VOLTAREN) 50 MG EC tablet Take 1 tablet (50 mg total) by mouth 2 (two) times daily as needed (chest pain). (Patient not taking: Reported on 1/31/2022) 60 tablet 1     No current facility-administered medications for this visit.      Prior to Admission medications    Medication Sig Start Date End Date Taking? Authorizing Provider   aspirin (ECOTRIN) 81 MG EC tablet Take 81 mg by mouth once daily.   Yes Historical Provider   diclofenac (VOLTAREN) 50 MG EC tablet Take 1 tablet (50 mg total) by mouth 2 (two) times daily as needed (chest pain).  Patient not taking: Reported on 1/31/2022 1/28/22   Adarsh Villa MD         History  Past Medical History:   Diagnosis Date    Hypertension     Inguinal hernia 2018    left     Past Surgical History:   Procedure Laterality Date    COLONOSCOPY      COLONOSCOPY N/A 7/6/2021    Procedure: COLONOSCOPY;  Surgeon: Abril Rincon,  MD;  Location: [facility];  Service: Endoscopy;  Laterality: N/A;     Social History     Socioeconomic History    Marital status: Single   Tobacco Use    Smoking status: Current Every Day Smoker     Packs/day: 1.00     Years: 50.00     Pack years: 50.00     Types: Cigarettes    Smokeless tobacco: Never Used   Substance and Sexual Activity    Alcohol use: Yes     Comment: 1 pint a day    Drug use: Never    Sexual activity: Yes     Partners: Female         Allergies  Review of patient's allergies indicates:   Allergen Reactions    Iodine and iodide containing products Swelling     shellfish    Shellfish containing products Hives         Review of Systems   Review of Systems   Constitutional: Negative for diaphoresis and malaise/fatigue.   HENT: Negative.    Cardiovascular: Positive for chest pain. Negative for claudication, dyspnea on exertion, irregular heartbeat, leg swelling, near-syncope, orthopnea, palpitations, paroxysmal nocturnal dyspnea and syncope.   Respiratory: Negative for shortness of breath.    Endocrine: Negative for polydipsia, polyphagia and polyuria.   Hematologic/Lymphatic: Does not bruise/bleed easily.   Musculoskeletal: Positive for joint pain.   Gastrointestinal: Negative for bloating, nausea and vomiting.   Genitourinary: Negative.    Neurological: Negative for excessive daytime sleepiness, dizziness, light-headedness, loss of balance and weakness.   Psychiatric/Behavioral: The patient is not nervous/anxious.    Allergic/Immunologic: Negative.          Physical Exam  Wt Readings from Last 1 Encounters:   01/31/22 69.9 kg (154 lb 1.6 oz)     BP Readings from Last 3 Encounters:   01/31/22 124/64   01/28/22 136/70   01/18/22 (!) 154/70     Pulse Readings from Last 1 Encounters:   01/31/22 69     Body mass index is 26.45 kg/m².    Physical Exam  Vitals and nursing note reviewed.   Constitutional:       Appearance: Normal appearance.      Comments: disheveled   HENT:      Head: Normocephalic  and atraumatic.      Mouth/Throat:      Mouth: Mucous membranes are moist.   Eyes:      Pupils: Pupils are equal, round, and reactive to light.   Cardiovascular:      Rate and Rhythm: Normal rate and regular rhythm.      Pulses:           Radial pulses are 2+ on the right side and 2+ on the left side.        Dorsalis pedis pulses are 2+ on the right side and 2+ on the left side.        Posterior tibial pulses are 2+ on the right side and 2+ on the left side.      Heart sounds: No murmur heard.      Pulmonary:      Effort: Pulmonary effort is normal. No respiratory distress.      Breath sounds: Normal breath sounds.   Abdominal:      General: There is no distension.      Tenderness: There is no abdominal tenderness.   Musculoskeletal:      Cervical back: Normal range of motion.      Right lower leg: No edema.      Left lower leg: No edema.   Skin:     General: Skin is warm and dry.      Findings: No erythema.   Neurological:      General: No focal deficit present.      Mental Status: He is alert.   Psychiatric:         Mood and Affect: Mood normal.         Behavior: Behavior normal.         Problem List Items Addressed This Visit        Other    Other chest pain    Overview     Unclear etiology- does sound musculoskeletal  Patient is a smoker and a heavy drinker- has quit drinking but do recommend stress testing and echo to assess based on these risk factors.  BP, lipids and chemistry look normal  Await testing- of all normal, no follow up needed           Current Assessment & Plan     As above.  Await testing           Other Visit Diagnoses     Chest pain, unspecified type        Relevant Orders    Echo    Exercise Stress - EKG                      Follow Up  PRN      @Leatha Bains DNP

## 2022-12-28 ENCOUNTER — TELEPHONE (OUTPATIENT)
Dept: PRIMARY CARE CLINIC | Facility: CLINIC | Age: 67
End: 2022-12-28
Payer: MEDICARE

## 2022-12-28 NOTE — TELEPHONE ENCOUNTER
----- Message from Niyah Winslow sent at 12/28/2022 11:32 AM CST -----  Contact: [phone] Patient  Caller is requesting an earlier appointment then we can schedule.  Caller is requesting a message be sent to the provider.  If this is for urgent care symptoms, did you offer other providers at this location, providers at other locations, or [facility]? (yes, no, n/a):    If this is for the patients physical, did you offer to schedule next available and put on wait list, or to see NP or PA for their physical?  (yes, no, n/a):    When is the next available appointment with their provider:  02/2023  Reason for the appointment:  R Shoulder pain, ER F/U  Patient preference of timeframe to be scheduled:  ASAP please  Would the patient like a call back, or a response through their [facility] portal?:   Call Back please. Please leave a detailed message if no answer. Thank you  Comments:

## 2023-01-10 ENCOUNTER — OFFICE VISIT (OUTPATIENT)
Dept: PRIMARY CARE CLINIC | Facility: CLINIC | Age: 68
End: 2023-01-10
Payer: MEDICARE

## 2023-01-10 VITALS
HEIGHT: 66 IN | TEMPERATURE: 98 F | RESPIRATION RATE: 18 BRPM | WEIGHT: 159.94 LBS | SYSTOLIC BLOOD PRESSURE: 130 MMHG | DIASTOLIC BLOOD PRESSURE: 70 MMHG | OXYGEN SATURATION: 98 % | BODY MASS INDEX: 25.71 KG/M2 | HEART RATE: 74 BPM

## 2023-01-10 DIAGNOSIS — Z51.81 MEDICATION MONITORING ENCOUNTER: ICD-10-CM

## 2023-01-10 DIAGNOSIS — L98.1 NEUROTIC EXCORIATIONS: ICD-10-CM

## 2023-01-10 DIAGNOSIS — Z23 NEED FOR VACCINATION: ICD-10-CM

## 2023-01-10 DIAGNOSIS — M25.511 ACUTE PAIN OF RIGHT SHOULDER: Primary | ICD-10-CM

## 2023-01-10 PROCEDURE — 1160F PR REVIEW ALL MEDS BY PRESCRIBER/CLIN PHARMACIST DOCUMENTED: ICD-10-PCS | Mod: CPTII,S$GLB,, | Performed by: STUDENT IN AN ORGANIZED HEALTH CARE EDUCATION/TRAINING PROGRAM

## 2023-01-10 PROCEDURE — 3288F PR FALLS RISK ASSESSMENT DOCUMENTED: ICD-10-PCS | Mod: CPTII,S$GLB,, | Performed by: STUDENT IN AN ORGANIZED HEALTH CARE EDUCATION/TRAINING PROGRAM

## 2023-01-10 PROCEDURE — 1101F PT FALLS ASSESS-DOCD LE1/YR: CPT | Mod: CPTII,S$GLB,, | Performed by: STUDENT IN AN ORGANIZED HEALTH CARE EDUCATION/TRAINING PROGRAM

## 2023-01-10 PROCEDURE — 1101F PR PT FALLS ASSESS DOC 0-1 FALLS W/OUT INJ PAST YR: ICD-10-PCS | Mod: CPTII,S$GLB,, | Performed by: STUDENT IN AN ORGANIZED HEALTH CARE EDUCATION/TRAINING PROGRAM

## 2023-01-10 PROCEDURE — 1160F RVW MEDS BY RX/DR IN RCRD: CPT | Mod: CPTII,S$GLB,, | Performed by: STUDENT IN AN ORGANIZED HEALTH CARE EDUCATION/TRAINING PROGRAM

## 2023-01-10 PROCEDURE — 3008F PR BODY MASS INDEX (BMI) DOCUMENTED: ICD-10-PCS | Mod: CPTII,S$GLB,, | Performed by: STUDENT IN AN ORGANIZED HEALTH CARE EDUCATION/TRAINING PROGRAM

## 2023-01-10 PROCEDURE — 3075F SYST BP GE 130 - 139MM HG: CPT | Mod: CPTII,S$GLB,, | Performed by: STUDENT IN AN ORGANIZED HEALTH CARE EDUCATION/TRAINING PROGRAM

## 2023-01-10 PROCEDURE — 3078F PR MOST RECENT DIASTOLIC BLOOD PRESSURE < 80 MM HG: ICD-10-PCS | Mod: CPTII,S$GLB,, | Performed by: STUDENT IN AN ORGANIZED HEALTH CARE EDUCATION/TRAINING PROGRAM

## 2023-01-10 PROCEDURE — 1125F AMNT PAIN NOTED PAIN PRSNT: CPT | Mod: CPTII,S$GLB,, | Performed by: STUDENT IN AN ORGANIZED HEALTH CARE EDUCATION/TRAINING PROGRAM

## 2023-01-10 PROCEDURE — 3008F BODY MASS INDEX DOCD: CPT | Mod: CPTII,S$GLB,, | Performed by: STUDENT IN AN ORGANIZED HEALTH CARE EDUCATION/TRAINING PROGRAM

## 2023-01-10 PROCEDURE — 3288F FALL RISK ASSESSMENT DOCD: CPT | Mod: CPTII,S$GLB,, | Performed by: STUDENT IN AN ORGANIZED HEALTH CARE EDUCATION/TRAINING PROGRAM

## 2023-01-10 PROCEDURE — 90694 VACC AIIV4 NO PRSRV 0.5ML IM: CPT | Mod: S$GLB,,, | Performed by: STUDENT IN AN ORGANIZED HEALTH CARE EDUCATION/TRAINING PROGRAM

## 2023-01-10 PROCEDURE — 1125F PR PAIN SEVERITY QUANTIFIED, PAIN PRESENT: ICD-10-PCS | Mod: CPTII,S$GLB,, | Performed by: STUDENT IN AN ORGANIZED HEALTH CARE EDUCATION/TRAINING PROGRAM

## 2023-01-10 PROCEDURE — 3075F PR MOST RECENT SYSTOLIC BLOOD PRESS GE 130-139MM HG: ICD-10-PCS | Mod: CPTII,S$GLB,, | Performed by: STUDENT IN AN ORGANIZED HEALTH CARE EDUCATION/TRAINING PROGRAM

## 2023-01-10 PROCEDURE — G0008 FLU VACCINE - QUADRIVALENT - ADJUVANTED: ICD-10-PCS | Mod: S$GLB,,, | Performed by: STUDENT IN AN ORGANIZED HEALTH CARE EDUCATION/TRAINING PROGRAM

## 2023-01-10 PROCEDURE — 99999 PR PBB SHADOW E&M-EST. PATIENT-LVL V: CPT | Mod: PBBFAC,,, | Performed by: STUDENT IN AN ORGANIZED HEALTH CARE EDUCATION/TRAINING PROGRAM

## 2023-01-10 PROCEDURE — 90694 FLU VACCINE - QUADRIVALENT - ADJUVANTED: ICD-10-PCS | Mod: S$GLB,,, | Performed by: STUDENT IN AN ORGANIZED HEALTH CARE EDUCATION/TRAINING PROGRAM

## 2023-01-10 PROCEDURE — 1159F MED LIST DOCD IN RCRD: CPT | Mod: CPTII,S$GLB,, | Performed by: STUDENT IN AN ORGANIZED HEALTH CARE EDUCATION/TRAINING PROGRAM

## 2023-01-10 PROCEDURE — G0008 ADMIN INFLUENZA VIRUS VAC: HCPCS | Mod: S$GLB,,, | Performed by: STUDENT IN AN ORGANIZED HEALTH CARE EDUCATION/TRAINING PROGRAM

## 2023-01-10 PROCEDURE — 99999 PR PBB SHADOW E&M-EST. PATIENT-LVL V: ICD-10-PCS | Mod: PBBFAC,,, | Performed by: STUDENT IN AN ORGANIZED HEALTH CARE EDUCATION/TRAINING PROGRAM

## 2023-01-10 PROCEDURE — 3078F DIAST BP <80 MM HG: CPT | Mod: CPTII,S$GLB,, | Performed by: STUDENT IN AN ORGANIZED HEALTH CARE EDUCATION/TRAINING PROGRAM

## 2023-01-10 PROCEDURE — 99214 OFFICE O/P EST MOD 30 MIN: CPT | Mod: 25,S$GLB,, | Performed by: STUDENT IN AN ORGANIZED HEALTH CARE EDUCATION/TRAINING PROGRAM

## 2023-01-10 PROCEDURE — 99214 PR OFFICE/OUTPT VISIT, EST, LEVL IV, 30-39 MIN: ICD-10-PCS | Mod: 25,S$GLB,, | Performed by: STUDENT IN AN ORGANIZED HEALTH CARE EDUCATION/TRAINING PROGRAM

## 2023-01-10 PROCEDURE — 1159F PR MEDICATION LIST DOCUMENTED IN MEDICAL RECORD: ICD-10-PCS | Mod: CPTII,S$GLB,, | Performed by: STUDENT IN AN ORGANIZED HEALTH CARE EDUCATION/TRAINING PROGRAM

## 2023-01-10 RX ORDER — MUPIROCIN 20 MG/G
OINTMENT TOPICAL 3 TIMES DAILY
Qty: 30 G | Refills: 1 | Status: SHIPPED | OUTPATIENT
Start: 2023-01-10 | End: 2023-06-09

## 2023-01-10 RX ORDER — IBUPROFEN 200 MG
200 TABLET ORAL EVERY 6 HOURS PRN
COMMUNITY
End: 2023-06-09

## 2023-01-10 RX ORDER — MELOXICAM 15 MG/1
15 TABLET ORAL DAILY
Qty: 30 TABLET | Refills: 1 | Status: SHIPPED | OUTPATIENT
Start: 2023-01-10 | End: 2023-06-09

## 2023-01-10 RX ORDER — METHOCARBAMOL 500 MG/1
500 TABLET, FILM COATED ORAL EVERY 4 HOURS PRN
Qty: 40 TABLET | Refills: 0 | Status: SHIPPED | OUTPATIENT
Start: 2023-01-10 | End: 2023-01-20

## 2023-01-10 NOTE — PROGRESS NOTES
"Subjective:       Patient ID: Manoj Elizabeth is a 67 y.o. male.    Chief Complaint: Follow-up (ER follow up for right shoulder pain.)      HPI:  67 y.o. male presents to [facility] for annual    Patient reports right shoulder pain that began a few months ago. Feels that he pinched a nerve. Tingling in fingers. Pain to anterior posterior and lateral right shoulder region.      Onset?: A few months  Progression?: Improved since onset  Inciting incident/new physical activity?: Was carrying drywall and person at other end dropped making him jerk  Past trauma/surgery?: No  Recurrent?: No  Description?: sharp at times, aching baseline  Radiates?: Yes, into back  Severity?: 5/10, always there, waxes and wanes  Worsening factors?: Certain movements  Interventions?: ibuprofen  Did interventions help?: Yes  Numbness/weakness/dropping items?: Numbness in fingers  History of MI, renal disease, or GI bleed/ulcer?: No      Last tetanus?: Unknown, long ago    Review of Systems   Constitutional:  Negative for chills, diaphoresis, fatigue and fever.   HENT:  Negative for congestion, sinus pressure, sneezing and sore throat.    Respiratory:  Negative for cough and shortness of breath.    Cardiovascular:  Negative for chest pain and palpitations.   Gastrointestinal:  Negative for abdominal pain, diarrhea, nausea and vomiting.   Musculoskeletal:  Positive for arthralgias. Negative for joint swelling and myalgias.   Skin:  Negative for rash and wound.   Neurological:  Negative for dizziness and weakness.     Objective:      Vitals:    01/10/23 1134   BP: 130/70   BP Location: Left arm   Patient Position: Sitting   BP Method: Medium (Manual)   Pulse: 74   Resp: 18   Temp: 98 °F (36.7 °C)   TempSrc: Skin   SpO2: 98%   Weight: 72.6 kg (159 lb 15.1 oz)   Height: 5' 6" (1.676 m)     Physical Exam  Vitals reviewed.   Constitutional:       General: He is not in acute distress.     Appearance: Normal appearance. He is not ill-appearing. "   HENT:      Head: Normocephalic and atraumatic.   Eyes:      General:         Right eye: No discharge.         Left eye: No discharge.      Conjunctiva/sclera: Conjunctivae normal.   Cardiovascular:      Rate and Rhythm: Normal rate and regular rhythm.      Pulses: Normal pulses.      Heart sounds: No murmur heard.  Pulmonary:      Effort: Pulmonary effort is normal.      Breath sounds: Normal breath sounds.   Abdominal:      Palpations: Abdomen is soft.      Tenderness: There is no abdominal tenderness.   Musculoskeletal:         General: No deformity.      Right shoulder: No swelling, deformity, effusion, laceration, tenderness, bony tenderness or crepitus. Normal range of motion. Normal strength.      Left shoulder: Tenderness (right bicepital groove) and bony tenderness (Right AC joint without deformity) present. No swelling, deformity, effusion, laceration or crepitus. Normal range of motion. Normal strength.      Comments: Normal bilateral speed, empty can, posterior lift off, Sami horn.  Pain to right superior trapezius with crossover test   Skin:     General: Skin is warm and dry.      Coloration: Skin is not jaundiced.   Neurological:      General: No focal deficit present.      Mental Status: He is alert and oriented to person, place, and time.   Psychiatric:         Mood and Affect: Mood normal.         Behavior: Behavior normal.           Lab Results   Component Value Date     01/18/2022    K 4.4 01/18/2022     01/18/2022    CO2 22 (L) 01/18/2022    BUN 10 01/18/2022    CREATININE 0.8 01/18/2022    ANIONGAP 13 01/18/2022     No results found for: HGBA1C  Lab Results   Component Value Date    BNP <10 01/18/2022       Lab Results   Component Value Date    WBC 9.69 01/18/2022    HGB 14.5 01/18/2022    HCT 44.7 01/18/2022     01/18/2022    GRAN 6.8 01/18/2022    GRAN 70.4 01/18/2022     Lab Results   Component Value Date    CHOL 157 05/22/2021    HDL 70 05/22/2021    LDLCALC 75.4  05/22/2021    TRIG 58 05/22/2021          Current Outpatient Medications:     ibuprofen (ADVIL,MOTRIN) 200 MG tablet, Take 200 mg by mouth every 6 (six) hours as needed for Pain., Disp: , Rfl:     diphth,pertus,acell,,tetanus (BOOSTRIX) 2.5-8-5 Lf-mcg-Lf/0.5mL Susp, Inject 0.5 mLs into the muscle once. for 1 dose, Disp: 0.5 mL, Rfl: 0    meloxicam (MOBIC) 15 MG tablet, Take 1 tablet (15 mg total) by mouth once daily., Disp: 30 tablet, Rfl: 1    methocarbamoL (ROBAXIN) 500 MG Tab, Take 1 tablet (500 mg total) by mouth every 4 (four) hours as needed., Disp: 40 tablet, Rfl: 0    mupirocin (BACTROBAN) 2 % ointment, Apply topically 3 (three) times daily., Disp: 30 g, Rfl: 1        Assessment:       1. Acute pain of right shoulder    2. Medication monitoring encounter    3. Encounter for screening for cardiovascular disorders    4. Need for vaccination    5. Neurotic excoriations           Plan:       Acute pain of right shoulder  Medication monitoring encounter  -     CBC Auto Differential; Future; Expected date: 01/10/2023  -     Comprehensive Metabolic Panel; Future; Expected date: 01/10/2023  -     meloxicam (MOBIC) 15 MG tablet; Take 1 tablet (15 mg total) by mouth once daily.  Dispense: 30 tablet; Refill: 1  -     X-ray Shoulder 2 or More Views Right; Future; Expected date: 01/10/2023  -     methocarbamoL (ROBAXIN) 500 MG Tab; Take 1 tablet (500 mg total) by mouth every 4 (four) hours as needed.  Dispense: 40 tablet; Refill: 0  - Will refer to PT and/or Orthopaedics if no improvement at follow-up    Need for vaccination  -     Influenza (FLUAD) - Quadrivalent (Adjuvanted) *Preferred* (65+) (PF)  -     Discontinue: DIPH,PERTUSS,ACEL,,TET VAC,PF,, ADULT, (ADACEL) 2 Lf-(2.5-5-3-5 mcg)-5Lf/0.5 mL injection; Inject 0.5 mLs into the muscle once. for 1 dose  Dispense: 0.5 mL; Refill: 0  -     diphth,pertus,acell,,tetanus (BOOSTRIX) 2.5-8-5 Lf-mcg-Lf/0.5mL Susp; Inject 0.5 mLs into the muscle once. for 1 dose  Dispense: 0.5  mL; Refill: 0    Neurotic excoriations  -     mupirocin (BACTROBAN) 2 % ointment; Apply topically 3 (three) times daily.  Dispense: 30 g; Refill: 1  - Suspect scattered shallow excoriations to bilateral forearms related to folliculitis with picking    RTC in 6 weeks

## 2023-01-10 NOTE — PROGRESS NOTES
Verified pt ID using name and .  Administered Influenza +65 in left deltoid per physician order using aseptic technique. Aspirated and no blood return noted. Pt tolerated well with no adverse reactions noted.

## 2023-02-03 ENCOUNTER — TELEPHONE (OUTPATIENT)
Dept: OPHTHALMOLOGY | Facility: CLINIC | Age: 68
End: 2023-02-03
Payer: MEDICARE

## 2023-02-03 NOTE — TELEPHONE ENCOUNTER
----- Message from Erika Carter sent at 2/3/2023 11:03 AM CST -----  PT REQUESTING TO SCHEDULE AN APPT     PT is requesting to schedule an appointment that our scheduling dept cannot schedule.    Who called: Ofe pts friend  Best call back #: [phone]  When pt wants appt:  Reason for appt:  Additional notes: Ofe says pt has a piece of cement in his eye; says pt eye is red

## 2023-02-22 ENCOUNTER — OFFICE VISIT (OUTPATIENT)
Dept: PRIMARY CARE CLINIC | Facility: CLINIC | Age: 68
End: 2023-02-22
Payer: MEDICARE

## 2023-02-22 VITALS
TEMPERATURE: 98 F | BODY MASS INDEX: 25.81 KG/M2 | DIASTOLIC BLOOD PRESSURE: 64 MMHG | OXYGEN SATURATION: 98 % | RESPIRATION RATE: 18 BRPM | SYSTOLIC BLOOD PRESSURE: 124 MMHG | WEIGHT: 160.63 LBS | HEIGHT: 66 IN | HEART RATE: 73 BPM

## 2023-02-22 DIAGNOSIS — B88.8 INFESTATION BY BED BUG: ICD-10-CM

## 2023-02-22 DIAGNOSIS — L30.9 DERMATITIS: Primary | ICD-10-CM

## 2023-02-22 PROCEDURE — 3008F PR BODY MASS INDEX (BMI) DOCUMENTED: ICD-10-PCS | Mod: CPTII,S$GLB,, | Performed by: STUDENT IN AN ORGANIZED HEALTH CARE EDUCATION/TRAINING PROGRAM

## 2023-02-22 PROCEDURE — 99999 PR PBB SHADOW E&M-EST. PATIENT-LVL IV: ICD-10-PCS | Mod: PBBFAC,,, | Performed by: STUDENT IN AN ORGANIZED HEALTH CARE EDUCATION/TRAINING PROGRAM

## 2023-02-22 PROCEDURE — 3074F PR MOST RECENT SYSTOLIC BLOOD PRESSURE < 130 MM HG: ICD-10-PCS | Mod: CPTII,S$GLB,, | Performed by: STUDENT IN AN ORGANIZED HEALTH CARE EDUCATION/TRAINING PROGRAM

## 2023-02-22 PROCEDURE — 1160F RVW MEDS BY RX/DR IN RCRD: CPT | Mod: CPTII,S$GLB,, | Performed by: STUDENT IN AN ORGANIZED HEALTH CARE EDUCATION/TRAINING PROGRAM

## 2023-02-22 PROCEDURE — 99214 OFFICE O/P EST MOD 30 MIN: CPT | Mod: 25,S$GLB,, | Performed by: STUDENT IN AN ORGANIZED HEALTH CARE EDUCATION/TRAINING PROGRAM

## 2023-02-22 PROCEDURE — 99999 PR PBB SHADOW E&M-EST. PATIENT-LVL IV: CPT | Mod: PBBFAC,,, | Performed by: STUDENT IN AN ORGANIZED HEALTH CARE EDUCATION/TRAINING PROGRAM

## 2023-02-22 PROCEDURE — 3074F SYST BP LT 130 MM HG: CPT | Mod: CPTII,S$GLB,, | Performed by: STUDENT IN AN ORGANIZED HEALTH CARE EDUCATION/TRAINING PROGRAM

## 2023-02-22 PROCEDURE — 1101F PR PT FALLS ASSESS DOC 0-1 FALLS W/OUT INJ PAST YR: ICD-10-PCS | Mod: CPTII,S$GLB,, | Performed by: STUDENT IN AN ORGANIZED HEALTH CARE EDUCATION/TRAINING PROGRAM

## 2023-02-22 PROCEDURE — 3288F FALL RISK ASSESSMENT DOCD: CPT | Mod: CPTII,S$GLB,, | Performed by: STUDENT IN AN ORGANIZED HEALTH CARE EDUCATION/TRAINING PROGRAM

## 2023-02-22 PROCEDURE — 99214 PR OFFICE/OUTPT VISIT, EST, LEVL IV, 30-39 MIN: ICD-10-PCS | Mod: 25,S$GLB,, | Performed by: STUDENT IN AN ORGANIZED HEALTH CARE EDUCATION/TRAINING PROGRAM

## 2023-02-22 PROCEDURE — 1159F PR MEDICATION LIST DOCUMENTED IN MEDICAL RECORD: ICD-10-PCS | Mod: CPTII,S$GLB,, | Performed by: STUDENT IN AN ORGANIZED HEALTH CARE EDUCATION/TRAINING PROGRAM

## 2023-02-22 PROCEDURE — 3288F PR FALLS RISK ASSESSMENT DOCUMENTED: ICD-10-PCS | Mod: CPTII,S$GLB,, | Performed by: STUDENT IN AN ORGANIZED HEALTH CARE EDUCATION/TRAINING PROGRAM

## 2023-02-22 PROCEDURE — 3078F DIAST BP <80 MM HG: CPT | Mod: CPTII,S$GLB,, | Performed by: STUDENT IN AN ORGANIZED HEALTH CARE EDUCATION/TRAINING PROGRAM

## 2023-02-22 PROCEDURE — 96372 THER/PROPH/DIAG INJ SC/IM: CPT | Mod: S$GLB,,, | Performed by: STUDENT IN AN ORGANIZED HEALTH CARE EDUCATION/TRAINING PROGRAM

## 2023-02-22 PROCEDURE — 1101F PT FALLS ASSESS-DOCD LE1/YR: CPT | Mod: CPTII,S$GLB,, | Performed by: STUDENT IN AN ORGANIZED HEALTH CARE EDUCATION/TRAINING PROGRAM

## 2023-02-22 PROCEDURE — 1160F PR REVIEW ALL MEDS BY PRESCRIBER/CLIN PHARMACIST DOCUMENTED: ICD-10-PCS | Mod: CPTII,S$GLB,, | Performed by: STUDENT IN AN ORGANIZED HEALTH CARE EDUCATION/TRAINING PROGRAM

## 2023-02-22 PROCEDURE — 1126F PR PAIN SEVERITY QUANTIFIED, NO PAIN PRESENT: ICD-10-PCS | Mod: CPTII,S$GLB,, | Performed by: STUDENT IN AN ORGANIZED HEALTH CARE EDUCATION/TRAINING PROGRAM

## 2023-02-22 PROCEDURE — 3008F BODY MASS INDEX DOCD: CPT | Mod: CPTII,S$GLB,, | Performed by: STUDENT IN AN ORGANIZED HEALTH CARE EDUCATION/TRAINING PROGRAM

## 2023-02-22 PROCEDURE — 1126F AMNT PAIN NOTED NONE PRSNT: CPT | Mod: CPTII,S$GLB,, | Performed by: STUDENT IN AN ORGANIZED HEALTH CARE EDUCATION/TRAINING PROGRAM

## 2023-02-22 PROCEDURE — 96372 PR INJECTION,THERAP/PROPH/DIAG2ST, IM OR SUBCUT: ICD-10-PCS | Mod: S$GLB,,, | Performed by: STUDENT IN AN ORGANIZED HEALTH CARE EDUCATION/TRAINING PROGRAM

## 2023-02-22 PROCEDURE — 3078F PR MOST RECENT DIASTOLIC BLOOD PRESSURE < 80 MM HG: ICD-10-PCS | Mod: CPTII,S$GLB,, | Performed by: STUDENT IN AN ORGANIZED HEALTH CARE EDUCATION/TRAINING PROGRAM

## 2023-02-22 PROCEDURE — 1159F MED LIST DOCD IN RCRD: CPT | Mod: CPTII,S$GLB,, | Performed by: STUDENT IN AN ORGANIZED HEALTH CARE EDUCATION/TRAINING PROGRAM

## 2023-02-22 RX ORDER — TRIAMCINOLONE ACETONIDE 40 MG/ML
60 INJECTION, SUSPENSION INTRA-ARTICULAR; INTRAMUSCULAR
Status: COMPLETED | OUTPATIENT
Start: 2023-02-22 | End: 2023-02-22

## 2023-02-22 RX ORDER — BETAMETHASONE SODIUM PHOSPHATE AND BETAMETHASONE ACETATE 3; 3 MG/ML; MG/ML
6 INJECTION, SUSPENSION INTRA-ARTICULAR; INTRALESIONAL; INTRAMUSCULAR; SOFT TISSUE
Status: COMPLETED | OUTPATIENT
Start: 2023-02-22 | End: 2023-02-22

## 2023-02-22 RX ADMIN — TRIAMCINOLONE ACETONIDE 60 MG: 40 INJECTION, SUSPENSION INTRA-ARTICULAR; INTRAMUSCULAR at 10:02

## 2023-02-22 RX ADMIN — BETAMETHASONE SODIUM PHOSPHATE AND BETAMETHASONE ACETATE 6 MG: 3; 3 INJECTION, SUSPENSION INTRA-ARTICULAR; INTRALESIONAL; INTRAMUSCULAR; SOFT TISSUE at 10:02

## 2023-02-22 NOTE — PROGRESS NOTES
"Subjective:       Patient ID: Manoj Elizabeth is a 67 y.o. male.    Chief Complaint: Rash (Pt in for rash all over the body with itching for 3 weeks)    HPI:  67 y.o. male presents to [facility] here for follow-up visit    Acute concerns?: Patient has been having rash to body for 3 weeks. Did notice at least 1 bug in bed and removed mattress. Itching is worse at night. Not bothersome during the day.    Location?: Arms, legs, and lower abdomen.  New medications?: No  New skin products/soaps/detergent?: No  Exposures?: Was weedeating poison oak off of a camp about 3 weeks ago.  History of rash/recurrent?: Yes, with poison Oak  Itching?: Yes  Painful?: No    Review of Systems   Constitutional:  Negative for chills, diaphoresis, fatigue and fever.   HENT:  Negative for congestion, sneezing and sore throat.    Respiratory:  Negative for cough and shortness of breath.    Cardiovascular:  Negative for chest pain and palpitations.   Gastrointestinal:  Negative for abdominal pain, diarrhea, nausea and vomiting.   Musculoskeletal:  Negative for joint swelling and myalgias.   Skin:  Positive for rash and wound (shallow excoriations to arms, legs, and lower abdomen).   Neurological:  Negative for weakness and numbness.     Objective:      Vitals:    02/22/23 0928   BP: 124/64   BP Location: Left arm   Patient Position: Sitting   BP Method: Medium (Manual)   Pulse: 73   Resp: 18   Temp: 97.7 °F (36.5 °C)   TempSrc: Temporal   SpO2: 98%   Weight: 72.8 kg (160 lb 9.7 oz)   Height: 5' 6" (1.676 m)     Physical Exam  Vitals reviewed.   Constitutional:       General: He is not in acute distress.     Appearance: Normal appearance. He is not ill-appearing.   HENT:      Head: Normocephalic and atraumatic.   Eyes:      General:         Right eye: No discharge.         Left eye: No discharge.      Conjunctiva/sclera: Conjunctivae normal.   Cardiovascular:      Rate and Rhythm: Normal rate.   Pulmonary:      Effort: Pulmonary effort is " normal.   Musculoskeletal:         General: No deformity.   Skin:     Coloration: Skin is not jaundiced or pale.      Comments: Diffuse rash to forearms, low back, and bilateral distal lowoer extremities. Mild involvement of lower abdomen. Shallow excoriations with mild erythema. Raised papular lesions in diffuse, non-linear pattern.   Neurological:      General: No focal deficit present.      Mental Status: He is alert and oriented to person, place, and time.   Psychiatric:         Mood and Affect: Mood normal.         Behavior: Behavior normal.           Lab Results   Component Value Date     01/10/2023    K 5.1 01/10/2023     01/10/2023    CO2 25 01/10/2023    BUN 17 01/10/2023    CREATININE 0.8 01/10/2023    ANIONGAP 9 01/10/2023     No results found for: HGBA1C  Lab Results   Component Value Date    BNP <10 01/18/2022       Lab Results   Component Value Date    WBC 7.42 01/10/2023    HGB 13.5 (L) 01/10/2023    HCT 42.0 01/10/2023     01/10/2023    GRAN 3.9 01/10/2023    GRAN 52.1 01/10/2023     Lab Results   Component Value Date    CHOL 157 05/22/2021    HDL 70 05/22/2021    LDLCALC 75.4 05/22/2021    TRIG 58 05/22/2021          Current Outpatient Medications:     ibuprofen (ADVIL,MOTRIN) 200 MG tablet, Take 200 mg by mouth every 6 (six) hours as needed for Pain., Disp: , Rfl:     meloxicam (MOBIC) 15 MG tablet, Take 1 tablet (15 mg total) by mouth once daily., Disp: 30 tablet, Rfl: 1    mupirocin (BACTROBAN) 2 % ointment, Apply topically 3 (three) times daily., Disp: 30 g, Rfl: 1    Current Facility-Administered Medications:     betamethasone acetate-betamethasone sodium phosphate injection 6 mg, 6 mg, Intramuscular, 1 time in Clinic/HOD, Adarsh Villa MD    triamcinolone acetonide injection 60 mg, 60 mg, Intramuscular, 1 time in Clinic/HOD, Adarsh Villa MD        Assessment:       1. Dermatitis    2. Infestation by bed bug           Plan:       Dermatitis  Infestation by bed  bug  -     triamcinolone acetonide injection 60 mg  -     betamethasone acetate-betamethasone sodium phosphate injection 6 mg  - Recommend patient contact local  to inspect ome for current concern  - Difficulty to pin diagnosis of bed bug bites with rash distribution, recommend all hypoallergenic products at this time and will treat as possible poison ivy at this time with steroid injection and recommend PRN benadryl    RTC in 4 weeks

## 2023-02-22 NOTE — PROGRESS NOTES
Verified pt ID using name and . Betamethasone 6mg and Triamcinalone 60mg  Administered IM in right glut per physician order using aseptic technique. Aspirated and no blood return noted. Pt tolerated well with no adverse reactions noted.

## 2023-06-01 ENCOUNTER — TELEPHONE (OUTPATIENT)
Dept: PRIMARY CARE CLINIC | Facility: CLINIC | Age: 68
End: 2023-06-01
Payer: MEDICARE

## 2023-06-01 NOTE — TELEPHONE ENCOUNTER
----- Message from Ivet Whitehead sent at 6/1/2023 10:47 AM CDT -----  Regarding: same day appt  Name of Who is Calling: LUCI WELLER [[id number]]       What is the request in detail: Pt would like to be seen today. Pt has been having severe right shoulder pain. He has been to the ER with no help. Requesting rehab if possible. Please advise.      Can the clinic reply by [facility]: no       What Number to Call Back if not in [facility]: Telephone Information:  Mobile          [phone]

## 2023-06-09 ENCOUNTER — OFFICE VISIT (OUTPATIENT)
Dept: PRIMARY CARE CLINIC | Facility: CLINIC | Age: 68
End: 2023-06-09
Payer: MEDICARE

## 2023-06-09 VITALS
HEART RATE: 65 BPM | OXYGEN SATURATION: 100 % | WEIGHT: 149.25 LBS | SYSTOLIC BLOOD PRESSURE: 130 MMHG | HEIGHT: 66 IN | DIASTOLIC BLOOD PRESSURE: 74 MMHG | TEMPERATURE: 98 F | BODY MASS INDEX: 23.99 KG/M2 | RESPIRATION RATE: 18 BRPM

## 2023-06-09 DIAGNOSIS — M25.511 ACUTE PAIN OF RIGHT SHOULDER: ICD-10-CM

## 2023-06-09 DIAGNOSIS — R07.9 RIGHT-SIDED CHEST PAIN: Primary | ICD-10-CM

## 2023-06-09 PROCEDURE — 1159F PR MEDICATION LIST DOCUMENTED IN MEDICAL RECORD: ICD-10-PCS | Mod: CPTII,S$GLB,, | Performed by: STUDENT IN AN ORGANIZED HEALTH CARE EDUCATION/TRAINING PROGRAM

## 2023-06-09 PROCEDURE — 93005 EKG 12-LEAD: ICD-10-PCS | Mod: S$GLB,,, | Performed by: STUDENT IN AN ORGANIZED HEALTH CARE EDUCATION/TRAINING PROGRAM

## 2023-06-09 PROCEDURE — 93005 ELECTROCARDIOGRAM TRACING: CPT | Mod: S$GLB,,, | Performed by: STUDENT IN AN ORGANIZED HEALTH CARE EDUCATION/TRAINING PROGRAM

## 2023-06-09 PROCEDURE — 3288F PR FALLS RISK ASSESSMENT DOCUMENTED: ICD-10-PCS | Mod: CPTII,S$GLB,, | Performed by: STUDENT IN AN ORGANIZED HEALTH CARE EDUCATION/TRAINING PROGRAM

## 2023-06-09 PROCEDURE — 3008F PR BODY MASS INDEX (BMI) DOCUMENTED: ICD-10-PCS | Mod: CPTII,S$GLB,, | Performed by: STUDENT IN AN ORGANIZED HEALTH CARE EDUCATION/TRAINING PROGRAM

## 2023-06-09 PROCEDURE — 3288F FALL RISK ASSESSMENT DOCD: CPT | Mod: CPTII,S$GLB,, | Performed by: STUDENT IN AN ORGANIZED HEALTH CARE EDUCATION/TRAINING PROGRAM

## 2023-06-09 PROCEDURE — 1160F PR REVIEW ALL MEDS BY PRESCRIBER/CLIN PHARMACIST DOCUMENTED: ICD-10-PCS | Mod: CPTII,S$GLB,, | Performed by: STUDENT IN AN ORGANIZED HEALTH CARE EDUCATION/TRAINING PROGRAM

## 2023-06-09 PROCEDURE — 1159F MED LIST DOCD IN RCRD: CPT | Mod: CPTII,S$GLB,, | Performed by: STUDENT IN AN ORGANIZED HEALTH CARE EDUCATION/TRAINING PROGRAM

## 2023-06-09 PROCEDURE — 3078F DIAST BP <80 MM HG: CPT | Mod: CPTII,S$GLB,, | Performed by: STUDENT IN AN ORGANIZED HEALTH CARE EDUCATION/TRAINING PROGRAM

## 2023-06-09 PROCEDURE — 99214 PR OFFICE/OUTPT VISIT, EST, LEVL IV, 30-39 MIN: ICD-10-PCS | Mod: S$GLB,,, | Performed by: STUDENT IN AN ORGANIZED HEALTH CARE EDUCATION/TRAINING PROGRAM

## 2023-06-09 PROCEDURE — 1101F PT FALLS ASSESS-DOCD LE1/YR: CPT | Mod: CPTII,S$GLB,, | Performed by: STUDENT IN AN ORGANIZED HEALTH CARE EDUCATION/TRAINING PROGRAM

## 2023-06-09 PROCEDURE — 99999 PR PBB SHADOW E&M-EST. PATIENT-LVL IV: ICD-10-PCS | Mod: PBBFAC,,, | Performed by: STUDENT IN AN ORGANIZED HEALTH CARE EDUCATION/TRAINING PROGRAM

## 2023-06-09 PROCEDURE — 93010 EKG 12-LEAD: ICD-10-PCS | Mod: S$GLB,,, | Performed by: INTERNAL MEDICINE

## 2023-06-09 PROCEDURE — 1125F PR PAIN SEVERITY QUANTIFIED, PAIN PRESENT: ICD-10-PCS | Mod: CPTII,S$GLB,, | Performed by: STUDENT IN AN ORGANIZED HEALTH CARE EDUCATION/TRAINING PROGRAM

## 2023-06-09 PROCEDURE — 1101F PR PT FALLS ASSESS DOC 0-1 FALLS W/OUT INJ PAST YR: ICD-10-PCS | Mod: CPTII,S$GLB,, | Performed by: STUDENT IN AN ORGANIZED HEALTH CARE EDUCATION/TRAINING PROGRAM

## 2023-06-09 PROCEDURE — 3008F BODY MASS INDEX DOCD: CPT | Mod: CPTII,S$GLB,, | Performed by: STUDENT IN AN ORGANIZED HEALTH CARE EDUCATION/TRAINING PROGRAM

## 2023-06-09 PROCEDURE — 99214 OFFICE O/P EST MOD 30 MIN: CPT | Mod: S$GLB,,, | Performed by: STUDENT IN AN ORGANIZED HEALTH CARE EDUCATION/TRAINING PROGRAM

## 2023-06-09 PROCEDURE — 3075F SYST BP GE 130 - 139MM HG: CPT | Mod: CPTII,S$GLB,, | Performed by: STUDENT IN AN ORGANIZED HEALTH CARE EDUCATION/TRAINING PROGRAM

## 2023-06-09 PROCEDURE — 1160F RVW MEDS BY RX/DR IN RCRD: CPT | Mod: CPTII,S$GLB,, | Performed by: STUDENT IN AN ORGANIZED HEALTH CARE EDUCATION/TRAINING PROGRAM

## 2023-06-09 PROCEDURE — 3075F PR MOST RECENT SYSTOLIC BLOOD PRESS GE 130-139MM HG: ICD-10-PCS | Mod: CPTII,S$GLB,, | Performed by: STUDENT IN AN ORGANIZED HEALTH CARE EDUCATION/TRAINING PROGRAM

## 2023-06-09 PROCEDURE — 3078F PR MOST RECENT DIASTOLIC BLOOD PRESSURE < 80 MM HG: ICD-10-PCS | Mod: CPTII,S$GLB,, | Performed by: STUDENT IN AN ORGANIZED HEALTH CARE EDUCATION/TRAINING PROGRAM

## 2023-06-09 PROCEDURE — 1125F AMNT PAIN NOTED PAIN PRSNT: CPT | Mod: CPTII,S$GLB,, | Performed by: STUDENT IN AN ORGANIZED HEALTH CARE EDUCATION/TRAINING PROGRAM

## 2023-06-09 PROCEDURE — 93010 ELECTROCARDIOGRAM REPORT: CPT | Mod: S$GLB,,, | Performed by: INTERNAL MEDICINE

## 2023-06-09 PROCEDURE — 99999 PR PBB SHADOW E&M-EST. PATIENT-LVL IV: CPT | Mod: PBBFAC,,, | Performed by: STUDENT IN AN ORGANIZED HEALTH CARE EDUCATION/TRAINING PROGRAM

## 2023-06-09 RX ORDER — MELOXICAM 15 MG/1
15 TABLET ORAL DAILY
Qty: 30 TABLET | Refills: 5 | Status: SHIPPED | OUTPATIENT
Start: 2023-06-09 | End: 2024-01-10

## 2023-06-09 NOTE — PROGRESS NOTES
"Subjective:       Patient ID: Manoj Elizabeth is a 68 y.o. male.    Chief Complaint: Shoulder Pain (Right shoulder pain)      HPI:  68 y.o. male presents to [facility] with complaints of ongoing shoulder pain. Last seen 1/10/2023 with concerns with plans for PT and Orthopaedics referral if not improving.    Currently taking ibuprofen for pain.    Today reports pain in left shoulder has been intense. Was improved on meloxicam, but out of medication currently.    Review of Systems   Constitutional:  Negative for chills, diaphoresis, fatigue and fever.   Respiratory:  Negative for cough and shortness of breath.    Cardiovascular:  Positive for chest pain (RIght shoulder and anterior upper chest. Worse with palpation). Negative for palpitations.   Gastrointestinal:  Negative for abdominal pain, diarrhea, nausea and vomiting.   Musculoskeletal:  Positive for arthralgias (right shoulder) and myalgias (Right anterior chest). Negative for joint swelling.   Skin:  Negative for rash and wound.   Neurological:  Positive for numbness (Right finger tips). Negative for weakness.     Objective:      Vitals:    06/09/23 0908   BP: 130/74   BP Location: Right arm   Patient Position: Sitting   BP Method: Medium (Manual)   Pulse: 65   Resp: 18   Temp: 98 °F (36.7 °C)   TempSrc: Temporal   SpO2: 100%   Weight: 67.7 kg (149 lb 4 oz)   Height: 5' 6" (1.676 m)     Physical Exam  Vitals reviewed.   Constitutional:       General: He is not in acute distress.     Appearance: Normal appearance. He is not ill-appearing.   HENT:      Head: Normocephalic and atraumatic.   Eyes:      General:         Right eye: No discharge.         Left eye: No discharge.      Conjunctiva/sclera: Conjunctivae normal.   Cardiovascular:      Rate and Rhythm: Normal rate and regular rhythm.      Pulses: Normal pulses.      Heart sounds: Normal heart sounds. No murmur heard.  Pulmonary:      Effort: Pulmonary effort is normal.      Breath sounds: Normal breath " sounds.   Chest:          Comments: Tenderness to palpation red region  Musculoskeletal:         General: No deformity.   Skin:     General: Skin is warm and dry.      Coloration: Skin is not jaundiced.   Neurological:      General: No focal deficit present.      Mental Status: He is alert and oriented to person, place, and time.   Psychiatric:         Mood and Affect: Mood normal.         Behavior: Behavior normal.           Lab Results   Component Value Date     01/10/2023    K 5.1 01/10/2023     01/10/2023    CO2 25 01/10/2023    BUN 17 01/10/2023    CREATININE 0.8 01/10/2023    ANIONGAP 9 01/10/2023     No results found for: HGBA1C  Lab Results   Component Value Date    BNP <10 01/18/2022       Lab Results   Component Value Date    WBC 7.42 01/10/2023    HGB 13.5 (L) 01/10/2023    HCT 42.0 01/10/2023     01/10/2023    GRAN 3.9 01/10/2023    GRAN 52.1 01/10/2023     Lab Results   Component Value Date    CHOL 157 05/22/2021    HDL 70 05/22/2021    LDLCALC 75.4 05/22/2021    TRIG 58 05/22/2021          Current Outpatient Medications:     meloxicam (MOBIC) 15 MG tablet, Take 1 tablet (15 mg total) by mouth once daily., Disp: 30 tablet, Rfl: 5        Assessment:       1. Right-sided chest pain    2. Acute pain of right shoulder           Plan:       Acute pain of right shoulder  Right-sided chest pain  -     X-Ray Chest PA And Lateral; Future; Expected date: 06/09/2023  -     EKG 12-lead  -     meloxicam (MOBIC) 15 MG tablet; Take 1 tablet (15 mg total) by mouth once daily.  Dispense: 30 tablet; Refill: 5  -     Ambulatory referral/consult to Physical/Occupational Therapy; Future; Expected date: 06/16/2023  -     Ambulatory referral/consult to Orthopedics; Future; Expected date: 06/16/2023  -     EKG 12-lead  - If no improvement with PT in 4-6 weeks, consider MRI/CT    RTC PRN

## 2023-08-23 ENCOUNTER — OFFICE VISIT (OUTPATIENT)
Dept: PRIMARY CARE CLINIC | Facility: CLINIC | Age: 68
End: 2023-08-23
Payer: MEDICARE

## 2023-08-23 VITALS
WEIGHT: 148.81 LBS | DIASTOLIC BLOOD PRESSURE: 78 MMHG | SYSTOLIC BLOOD PRESSURE: 106 MMHG | HEART RATE: 79 BPM | BODY MASS INDEX: 23.92 KG/M2 | TEMPERATURE: 98 F | OXYGEN SATURATION: 95 % | RESPIRATION RATE: 16 BRPM | HEIGHT: 66 IN

## 2023-08-23 DIAGNOSIS — R06.02 SHORTNESS OF BREATH: ICD-10-CM

## 2023-08-23 DIAGNOSIS — H10.9 CONJUNCTIVITIS OF BOTH EYES, UNSPECIFIED CONJUNCTIVITIS TYPE: Primary | ICD-10-CM

## 2023-08-23 DIAGNOSIS — F17.200 CURRENT EVERY DAY SMOKER: ICD-10-CM

## 2023-08-23 DIAGNOSIS — M25.511 ACUTE PAIN OF RIGHT SHOULDER: ICD-10-CM

## 2023-08-23 PROCEDURE — 3288F PR FALLS RISK ASSESSMENT DOCUMENTED: ICD-10-PCS | Mod: CPTII,S$GLB,, | Performed by: STUDENT IN AN ORGANIZED HEALTH CARE EDUCATION/TRAINING PROGRAM

## 2023-08-23 PROCEDURE — 3008F BODY MASS INDEX DOCD: CPT | Mod: CPTII,S$GLB,, | Performed by: STUDENT IN AN ORGANIZED HEALTH CARE EDUCATION/TRAINING PROGRAM

## 2023-08-23 PROCEDURE — 99999 PR PBB SHADOW E&M-EST. PATIENT-LVL IV: CPT | Mod: PBBFAC,,, | Performed by: STUDENT IN AN ORGANIZED HEALTH CARE EDUCATION/TRAINING PROGRAM

## 2023-08-23 PROCEDURE — 99214 OFFICE O/P EST MOD 30 MIN: CPT | Mod: S$GLB,,, | Performed by: STUDENT IN AN ORGANIZED HEALTH CARE EDUCATION/TRAINING PROGRAM

## 2023-08-23 PROCEDURE — 3008F PR BODY MASS INDEX (BMI) DOCUMENTED: ICD-10-PCS | Mod: CPTII,S$GLB,, | Performed by: STUDENT IN AN ORGANIZED HEALTH CARE EDUCATION/TRAINING PROGRAM

## 2023-08-23 PROCEDURE — 3078F DIAST BP <80 MM HG: CPT | Mod: CPTII,S$GLB,, | Performed by: STUDENT IN AN ORGANIZED HEALTH CARE EDUCATION/TRAINING PROGRAM

## 2023-08-23 PROCEDURE — 1101F PT FALLS ASSESS-DOCD LE1/YR: CPT | Mod: CPTII,S$GLB,, | Performed by: STUDENT IN AN ORGANIZED HEALTH CARE EDUCATION/TRAINING PROGRAM

## 2023-08-23 PROCEDURE — 1160F RVW MEDS BY RX/DR IN RCRD: CPT | Mod: CPTII,S$GLB,, | Performed by: STUDENT IN AN ORGANIZED HEALTH CARE EDUCATION/TRAINING PROGRAM

## 2023-08-23 PROCEDURE — 3288F FALL RISK ASSESSMENT DOCD: CPT | Mod: CPTII,S$GLB,, | Performed by: STUDENT IN AN ORGANIZED HEALTH CARE EDUCATION/TRAINING PROGRAM

## 2023-08-23 PROCEDURE — 3074F SYST BP LT 130 MM HG: CPT | Mod: CPTII,S$GLB,, | Performed by: STUDENT IN AN ORGANIZED HEALTH CARE EDUCATION/TRAINING PROGRAM

## 2023-08-23 PROCEDURE — 1159F PR MEDICATION LIST DOCUMENTED IN MEDICAL RECORD: ICD-10-PCS | Mod: CPTII,S$GLB,, | Performed by: STUDENT IN AN ORGANIZED HEALTH CARE EDUCATION/TRAINING PROGRAM

## 2023-08-23 PROCEDURE — 99999 PR PBB SHADOW E&M-EST. PATIENT-LVL IV: ICD-10-PCS | Mod: PBBFAC,,, | Performed by: STUDENT IN AN ORGANIZED HEALTH CARE EDUCATION/TRAINING PROGRAM

## 2023-08-23 PROCEDURE — 1160F PR REVIEW ALL MEDS BY PRESCRIBER/CLIN PHARMACIST DOCUMENTED: ICD-10-PCS | Mod: CPTII,S$GLB,, | Performed by: STUDENT IN AN ORGANIZED HEALTH CARE EDUCATION/TRAINING PROGRAM

## 2023-08-23 PROCEDURE — 1125F AMNT PAIN NOTED PAIN PRSNT: CPT | Mod: CPTII,S$GLB,, | Performed by: STUDENT IN AN ORGANIZED HEALTH CARE EDUCATION/TRAINING PROGRAM

## 2023-08-23 PROCEDURE — 3074F PR MOST RECENT SYSTOLIC BLOOD PRESSURE < 130 MM HG: ICD-10-PCS | Mod: CPTII,S$GLB,, | Performed by: STUDENT IN AN ORGANIZED HEALTH CARE EDUCATION/TRAINING PROGRAM

## 2023-08-23 PROCEDURE — 3078F PR MOST RECENT DIASTOLIC BLOOD PRESSURE < 80 MM HG: ICD-10-PCS | Mod: CPTII,S$GLB,, | Performed by: STUDENT IN AN ORGANIZED HEALTH CARE EDUCATION/TRAINING PROGRAM

## 2023-08-23 PROCEDURE — 1101F PR PT FALLS ASSESS DOC 0-1 FALLS W/OUT INJ PAST YR: ICD-10-PCS | Mod: CPTII,S$GLB,, | Performed by: STUDENT IN AN ORGANIZED HEALTH CARE EDUCATION/TRAINING PROGRAM

## 2023-08-23 PROCEDURE — 1159F MED LIST DOCD IN RCRD: CPT | Mod: CPTII,S$GLB,, | Performed by: STUDENT IN AN ORGANIZED HEALTH CARE EDUCATION/TRAINING PROGRAM

## 2023-08-23 PROCEDURE — 99214 PR OFFICE/OUTPT VISIT, EST, LEVL IV, 30-39 MIN: ICD-10-PCS | Mod: S$GLB,,, | Performed by: STUDENT IN AN ORGANIZED HEALTH CARE EDUCATION/TRAINING PROGRAM

## 2023-08-23 PROCEDURE — 1125F PR PAIN SEVERITY QUANTIFIED, PAIN PRESENT: ICD-10-PCS | Mod: CPTII,S$GLB,, | Performed by: STUDENT IN AN ORGANIZED HEALTH CARE EDUCATION/TRAINING PROGRAM

## 2023-08-23 RX ORDER — ALBUTEROL SULFATE 90 UG/1
2 AEROSOL, METERED RESPIRATORY (INHALATION) EVERY 6 HOURS PRN
Qty: 18 G | Refills: 11 | Status: SHIPPED | OUTPATIENT
Start: 2023-08-23

## 2023-08-23 RX ORDER — ERYTHROMYCIN 5 MG/G
OINTMENT OPHTHALMIC NIGHTLY
Qty: 3.5 G | Refills: 0 | Status: SHIPPED | OUTPATIENT
Start: 2023-08-23 | End: 2023-10-26

## 2023-08-23 NOTE — PROGRESS NOTES
"Subjective:       Patient ID: Manoj Elizabeth is a 68 y.o. male.    Chief Complaint: Pain (Pt complain of having pain in shoulder ), Eye Problem (Pt states that he has been having some green eye drainage), and Breathing Problem      HPI:  68 y.o. male presents to [facility] for follow-up visit    Currently in PT for right shoulder pain    Last seen in clinic 6/9/2023.    Today reports has needed to breath with pursed lips recently. Patient attributes breathing to     Patient reports green eye discharge and some recent shortness of breath. Pulse ox today 95%.    Reports currently smoking 3/4 to 1 ppd.    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    Review of Systems   Constitutional:  Negative for chills, diaphoresis, fatigue and fever.   HENT:  Negative for congestion, sinus pressure, sneezing and sore throat.    Eyes:  Positive for discharge. Negative for itching.   Respiratory:  Positive for shortness of breath. Negative for cough.    Cardiovascular:  Negative for chest pain and palpitations.   Gastrointestinal:  Negative for abdominal pain, diarrhea, nausea and vomiting.   Musculoskeletal:  Positive for arthralgias (right shoulder, does improve some with PT). Negative for joint swelling and myalgias.   Skin:  Negative for rash and wound.   Neurological:  Negative for dizziness and weakness.       Objective:      Vitals:    08/23/23 0827   BP: 106/78   BP Location: Right arm   Patient Position: Sitting   BP Method: Medium (Automatic)   Pulse: 79   Resp: 16   Temp: 98.2 °F (36.8 °C)   TempSrc: Temporal   SpO2: 95%   Weight: 67.5 kg (148 lb 13 oz)   Height: 5' 6" (1.676 m)     Physical Exam  Vitals reviewed.   Constitutional:       General: He is not in acute distress.     Appearance: Normal appearance. He is not ill-appearing.   HENT:      Head: Normocephalic and atraumatic.   Eyes:      General:         Right eye: No discharge.         Left eye: No " "discharge.      Conjunctiva/sclera: Conjunctivae normal.   Cardiovascular:      Rate and Rhythm: Normal rate and regular rhythm.      Heart sounds: Normal heart sounds. No murmur heard.  Pulmonary:      Effort: Pulmonary effort is normal. No respiratory distress.      Breath sounds: Normal breath sounds.   Musculoskeletal:         General: No deformity.   Skin:     General: Skin is warm and dry.      Coloration: Skin is not jaundiced or pale.   Neurological:      General: No focal deficit present.      Mental Status: He is alert and oriented to person, place, and time.   Psychiatric:         Mood and Affect: Mood normal.         Behavior: Behavior normal.             Lab Results   Component Value Date     01/10/2023    K 5.1 01/10/2023     01/10/2023    CO2 25 01/10/2023    BUN 17 01/10/2023    CREATININE 0.8 01/10/2023    ANIONGAP 9 01/10/2023     No results found for: "HGBA1C"  Lab Results   Component Value Date    BNP <10 01/18/2022       Lab Results   Component Value Date    WBC 7.42 01/10/2023    HGB 13.5 (L) 01/10/2023    HCT 42.0 01/10/2023     01/10/2023    GRAN 3.9 01/10/2023    GRAN 52.1 01/10/2023     Lab Results   Component Value Date    CHOL 157 05/22/2021    HDL 70 05/22/2021    LDLCALC 75.4 05/22/2021    TRIG 58 05/22/2021          Current Outpatient Medications:     meloxicam (MOBIC) 15 MG tablet, Take 1 tablet (15 mg total) by mouth once daily., Disp: 30 tablet, Rfl: 5    albuterol (VENTOLIN HFA) 90 mcg/actuation inhaler, Inhale 2 puffs into the lungs every 6 (six) hours as needed for Wheezing. Rescue, Disp: 18 g, Rfl: 11    erythromycin (ROMYCIN) ophthalmic ointment, Place into both eyes every evening., Disp: 3.5 g, Rfl: 0        Assessment:       1. Conjunctivitis of both eyes, unspecified conjunctivitis type    2. Shortness of breath    3. Current every day smoker           Plan:       Conjunctivitis of both eyes, unspecified conjunctivitis type  -     erythromycin (ROMYCIN) " ophthalmic ointment; Place into both eyes every evening.  Dispense: 3.5 g; Refill: 0  - Continue antibiotic for at least 7 days    Shortness of breath  Current every day smoker  -     albuterol (VENTOLIN HFA) 90 mcg/actuation inhaler; Inhale 2 puffs into the lungs every 6 (six) hours as needed for Wheezing. Rescue  Dispense: 18 g; Refill: 11  -     Complete PFT w/ bronchodilator; Future  - Will assess for COPD with long time smoker now 3/4 to 1 ppd and pursed lip breathing    RTC PRN

## 2023-10-26 ENCOUNTER — OFFICE VISIT (OUTPATIENT)
Dept: PRIMARY CARE CLINIC | Facility: CLINIC | Age: 68
End: 2023-10-26
Payer: MEDICARE

## 2023-10-26 VITALS
DIASTOLIC BLOOD PRESSURE: 80 MMHG | RESPIRATION RATE: 18 BRPM | WEIGHT: 159.06 LBS | TEMPERATURE: 97 F | OXYGEN SATURATION: 95 % | BODY MASS INDEX: 25.56 KG/M2 | SYSTOLIC BLOOD PRESSURE: 130 MMHG | HEART RATE: 93 BPM | HEIGHT: 66 IN

## 2023-10-26 DIAGNOSIS — R52 BODY ACHES: Primary | ICD-10-CM

## 2023-10-26 DIAGNOSIS — I70.0 AORTIC ATHEROSCLEROSIS: ICD-10-CM

## 2023-10-26 DIAGNOSIS — R61 DIAPHORESIS: ICD-10-CM

## 2023-10-26 DIAGNOSIS — J43.8 OTHER EMPHYSEMA: ICD-10-CM

## 2023-10-26 LAB
CTP QC/QA: YES
CTP QC/QA: YES
POC MOLECULAR INFLUENZA A AGN: NEGATIVE
POC MOLECULAR INFLUENZA B AGN: NEGATIVE
SARS-COV-2 RDRP RESP QL NAA+PROBE: NEGATIVE

## 2023-10-26 PROCEDURE — 3079F PR MOST RECENT DIASTOLIC BLOOD PRESSURE 80-89 MM HG: ICD-10-PCS | Mod: CPTII,S$GLB,, | Performed by: STUDENT IN AN ORGANIZED HEALTH CARE EDUCATION/TRAINING PROGRAM

## 2023-10-26 PROCEDURE — 99999 PR PBB SHADOW E&M-EST. PATIENT-LVL III: ICD-10-PCS | Mod: PBBFAC,,, | Performed by: STUDENT IN AN ORGANIZED HEALTH CARE EDUCATION/TRAINING PROGRAM

## 2023-10-26 PROCEDURE — 87502 POCT INFLUENZA A/B MOLECULAR: ICD-10-PCS | Mod: QW,S$GLB,, | Performed by: STUDENT IN AN ORGANIZED HEALTH CARE EDUCATION/TRAINING PROGRAM

## 2023-10-26 PROCEDURE — 3008F PR BODY MASS INDEX (BMI) DOCUMENTED: ICD-10-PCS | Mod: CPTII,S$GLB,, | Performed by: STUDENT IN AN ORGANIZED HEALTH CARE EDUCATION/TRAINING PROGRAM

## 2023-10-26 PROCEDURE — 1160F PR REVIEW ALL MEDS BY PRESCRIBER/CLIN PHARMACIST DOCUMENTED: ICD-10-PCS | Mod: CPTII,S$GLB,, | Performed by: STUDENT IN AN ORGANIZED HEALTH CARE EDUCATION/TRAINING PROGRAM

## 2023-10-26 PROCEDURE — 96372 PR INJECTION,THERAP/PROPH/DIAG2ST, IM OR SUBCUT: ICD-10-PCS | Mod: S$GLB,,, | Performed by: STUDENT IN AN ORGANIZED HEALTH CARE EDUCATION/TRAINING PROGRAM

## 2023-10-26 PROCEDURE — 1160F RVW MEDS BY RX/DR IN RCRD: CPT | Mod: CPTII,S$GLB,, | Performed by: STUDENT IN AN ORGANIZED HEALTH CARE EDUCATION/TRAINING PROGRAM

## 2023-10-26 PROCEDURE — 87635 SARS-COV-2 COVID-19 AMP PRB: CPT | Mod: QW,S$GLB,, | Performed by: STUDENT IN AN ORGANIZED HEALTH CARE EDUCATION/TRAINING PROGRAM

## 2023-10-26 PROCEDURE — 3075F PR MOST RECENT SYSTOLIC BLOOD PRESS GE 130-139MM HG: ICD-10-PCS | Mod: CPTII,S$GLB,, | Performed by: STUDENT IN AN ORGANIZED HEALTH CARE EDUCATION/TRAINING PROGRAM

## 2023-10-26 PROCEDURE — 99214 PR OFFICE/OUTPT VISIT, EST, LEVL IV, 30-39 MIN: ICD-10-PCS | Mod: 25,S$GLB,, | Performed by: STUDENT IN AN ORGANIZED HEALTH CARE EDUCATION/TRAINING PROGRAM

## 2023-10-26 PROCEDURE — 99214 OFFICE O/P EST MOD 30 MIN: CPT | Mod: 25,S$GLB,, | Performed by: STUDENT IN AN ORGANIZED HEALTH CARE EDUCATION/TRAINING PROGRAM

## 2023-10-26 PROCEDURE — 87635: ICD-10-PCS | Mod: QW,S$GLB,, | Performed by: STUDENT IN AN ORGANIZED HEALTH CARE EDUCATION/TRAINING PROGRAM

## 2023-10-26 PROCEDURE — 3075F SYST BP GE 130 - 139MM HG: CPT | Mod: CPTII,S$GLB,, | Performed by: STUDENT IN AN ORGANIZED HEALTH CARE EDUCATION/TRAINING PROGRAM

## 2023-10-26 PROCEDURE — 96372 THER/PROPH/DIAG INJ SC/IM: CPT | Mod: S$GLB,,, | Performed by: STUDENT IN AN ORGANIZED HEALTH CARE EDUCATION/TRAINING PROGRAM

## 2023-10-26 PROCEDURE — 3008F BODY MASS INDEX DOCD: CPT | Mod: CPTII,S$GLB,, | Performed by: STUDENT IN AN ORGANIZED HEALTH CARE EDUCATION/TRAINING PROGRAM

## 2023-10-26 PROCEDURE — 99999 PR PBB SHADOW E&M-EST. PATIENT-LVL III: CPT | Mod: PBBFAC,,, | Performed by: STUDENT IN AN ORGANIZED HEALTH CARE EDUCATION/TRAINING PROGRAM

## 2023-10-26 PROCEDURE — 87502 INFLUENZA DNA AMP PROBE: CPT | Mod: QW,S$GLB,, | Performed by: STUDENT IN AN ORGANIZED HEALTH CARE EDUCATION/TRAINING PROGRAM

## 2023-10-26 PROCEDURE — 3079F DIAST BP 80-89 MM HG: CPT | Mod: CPTII,S$GLB,, | Performed by: STUDENT IN AN ORGANIZED HEALTH CARE EDUCATION/TRAINING PROGRAM

## 2023-10-26 PROCEDURE — 1159F MED LIST DOCD IN RCRD: CPT | Mod: CPTII,S$GLB,, | Performed by: STUDENT IN AN ORGANIZED HEALTH CARE EDUCATION/TRAINING PROGRAM

## 2023-10-26 PROCEDURE — 1159F PR MEDICATION LIST DOCUMENTED IN MEDICAL RECORD: ICD-10-PCS | Mod: CPTII,S$GLB,, | Performed by: STUDENT IN AN ORGANIZED HEALTH CARE EDUCATION/TRAINING PROGRAM

## 2023-10-26 RX ORDER — BETAMETHASONE SODIUM PHOSPHATE AND BETAMETHASONE ACETATE 3; 3 MG/ML; MG/ML
6 INJECTION, SUSPENSION INTRA-ARTICULAR; INTRALESIONAL; INTRAMUSCULAR; SOFT TISSUE
Status: COMPLETED | OUTPATIENT
Start: 2023-10-26 | End: 2023-10-26

## 2023-10-26 RX ORDER — DOXYCYCLINE 100 MG/1
100 CAPSULE ORAL EVERY 12 HOURS
Qty: 20 CAPSULE | Refills: 0 | Status: SHIPPED | OUTPATIENT
Start: 2023-10-26 | End: 2023-11-05

## 2023-10-26 RX ADMIN — BETAMETHASONE SODIUM PHOSPHATE AND BETAMETHASONE ACETATE 6 MG: 3; 3 INJECTION, SUSPENSION INTRA-ARTICULAR; INTRALESIONAL; INTRAMUSCULAR; SOFT TISSUE at 03:10

## 2023-10-26 NOTE — PROGRESS NOTES
Identified pt. By name and   Administered celestone 6 mg to left ventro gluteal using aseptic technique

## 2023-10-26 NOTE — PROGRESS NOTES
"Subjective:       Patient ID: Manoj Elizabeth is a 68 y.o. male.    Chief Complaint: Shoulder Pain (right), Burning Eyes, and bodyaches    HPI:  68 y.o. male presents to [facility] with ongoing concerns for right shoulder pain.    Patient reports concerns for body aches and sweats.    Patient reports that he has been feeling bad overall. Began a few days ago. Feels like his entire body is aching. Feels cold and feverish.      Sick contacts?: No known  Fever?: Subjective  Shortness of breath?:  Cough?: No  Sore throat?: No  Loss of taste smell?: No  Received flu vaccine?: No  Received COVID vaccine?: Pfizer x3, has never had known COVID    Review of Systems   Constitutional:  Positive for chills, diaphoresis, fatigue and fever.        Aches and malaise   HENT:  Positive for rhinorrhea. Negative for congestion, sinus pressure, sneezing and sore throat.    Eyes:  Positive for itching. Negative for discharge.   Respiratory:  Negative for cough (States no, but coughing in clinic) and shortness of breath.    Cardiovascular:  Negative for chest pain and palpitations.   Gastrointestinal:  Negative for abdominal pain, diarrhea, nausea and vomiting.   Skin:  Negative for rash and wound.   Neurological:  Positive for dizziness, light-headedness and headaches.       Objective:      Vitals:    10/26/23 1431   BP: 130/80   BP Location: Left arm   Patient Position: Sitting   BP Method: Medium (Manual)   Pulse: 93   Resp: 18   Temp: 97.4 °F (36.3 °C)   TempSrc: Temporal   SpO2: 95%   Weight: 72.1 kg (159 lb 1 oz)   Height: 5' 6" (1.676 m)     Physical Exam  Vitals reviewed.   Constitutional:       General: He is not in acute distress.     Appearance: Normal appearance. He is not ill-appearing.   HENT:      Head: Normocephalic and atraumatic.   Eyes:      General:         Right eye: No discharge.         Left eye: No discharge.      Conjunctiva/sclera: Conjunctivae normal.   Cardiovascular:      Rate and Rhythm: Normal rate and " "regular rhythm.      Heart sounds: Normal heart sounds. No murmur heard.  Pulmonary:      Effort: Pulmonary effort is normal.      Breath sounds: Normal breath sounds.   Musculoskeletal:         General: No deformity.      Cervical back: Neck supple. No rigidity.   Lymphadenopathy:      Cervical: No cervical adenopathy.   Skin:     General: Skin is warm and dry.      Coloration: Skin is not jaundiced.   Neurological:      General: No focal deficit present.      Mental Status: He is alert and oriented to person, place, and time.   Psychiatric:         Mood and Affect: Mood normal.         Behavior: Behavior normal.             Lab Results   Component Value Date     01/10/2023    K 5.1 01/10/2023     01/10/2023    CO2 25 01/10/2023    BUN 17 01/10/2023    CREATININE 0.8 01/10/2023    ANIONGAP 9 01/10/2023     No results found for: "HGBA1C"  Lab Results   Component Value Date    BNP <10 01/18/2022       Lab Results   Component Value Date    WBC 7.42 01/10/2023    HGB 13.5 (L) 01/10/2023    HCT 42.0 01/10/2023     01/10/2023    GRAN 3.9 01/10/2023    GRAN 52.1 01/10/2023     Lab Results   Component Value Date    CHOL 157 05/22/2021    HDL 70 05/22/2021    LDLCALC 75.4 05/22/2021    TRIG 58 05/22/2021          Current Outpatient Medications:     albuterol (VENTOLIN HFA) 90 mcg/actuation inhaler, Inhale 2 puffs into the lungs every 6 (six) hours as needed for Wheezing. Rescue, Disp: 18 g, Rfl: 11    meloxicam (MOBIC) 15 MG tablet, Take 1 tablet (15 mg total) by mouth once daily., Disp: 30 tablet, Rfl: 5    doxycycline (VIBRAMYCIN) 100 MG Cap, Take 1 capsule (100 mg total) by mouth every 12 (twelve) hours. for 10 days, Disp: 20 capsule, Rfl: 0    Current Facility-Administered Medications:     betamethasone acetate-betamethasone sodium phosphate injection 6 mg, 6 mg, Intramuscular, 1 time in Clinic/HOD, Adarsh Villa MD        Assessment:       1. Body aches    2. Diaphoresis    3. Aortic " atherosclerosis    4. Other emphysema           Plan:       Body aches  Diaphoresis  Aortic atherosclerosis  Other emphysema  -     POCT COVID-19 Rapid Screening  -     POCT Influenza A/B Molecular  -     betamethasone acetate-betamethasone sodium phosphate injection 6 mg  -     doxycycline (VIBRAMYCIN) 100 MG Cap; Take 1 capsule (100 mg total) by mouth every 12 (twelve) hours. for 10 days  Dispense: 20 capsule; Refill: 0  - Recommend 5 day quarantine from symptom onset / 24 hours from last fever (whichever is longer) or negative COVID screening test (today) before resuming normal activities. Mask wearing x5 additional days past quarantine if positive. Present to ED if severely fatigued or respiratory distress develops   - Patient coughing on exam, with ongoing alcohol use concerns for possible aspiration  - Blood pressure at goal today  - If positive for Flu or COVID, will treat with antiviral  - Risk vs benefit of steroid injection provided today's visit including, but not limited to fat atrophy, prominence of vasculature, skin lightening/thinning, infection, death of ends of long bones. Informed of no benefit of illness course, just symptomatic relief.      RTC PRN

## 2024-01-10 ENCOUNTER — TELEPHONE (OUTPATIENT)
Dept: PRIMARY CARE CLINIC | Facility: CLINIC | Age: 69
End: 2024-01-10
Payer: MEDICARE

## 2024-01-10 DIAGNOSIS — M25.511 ACUTE PAIN OF RIGHT SHOULDER: ICD-10-CM

## 2024-01-10 RX ORDER — MELOXICAM 15 MG/1
15 TABLET ORAL
Qty: 30 TABLET | Refills: 0 | Status: SHIPPED | OUTPATIENT
Start: 2024-01-10 | End: 2024-02-08 | Stop reason: SDUPTHER

## 2024-01-10 NOTE — TELEPHONE ENCOUNTER
Care Due:                  Date            Visit Type   Department     Provider  --------------------------------------------------------------------------------                                SAME DAY -                              ESTABLISHED   [facility]  Last Visit: 10-      PATIENT      PRIMARY CARE   Adarsh Villa  Next Visit: None Scheduled  None         None Found                                                            Last  Test          Frequency    Reason                     Performed    Due Date  --------------------------------------------------------------------------------    CBC.........  12 months..  meloxicam................  01-   01-    CMP.........  12 months..  meloxicam................  01-   01-    [facility] Embedded Care Due Messages. Reference number: [id number].   1/10/2024 7:02:13 AM CST

## 2024-01-10 NOTE — TELEPHONE ENCOUNTER
----- Message from Racquel Monique sent at 1/10/2024 10:41 AM CST -----  Contact: Pt [phone]  Patient would like to get a referral.  Referral to what specialty:  Optometry   Does the patient want the referral with a specific physician:   Is the specialist an [facility]:   Reason (be specific):  Cataracts   Does the patient already have the specialty clinic appointment scheduled:    If yes, what date is the appointment scheduled:     Is the insurance listed in Epic correct? (this is important for a referral):  Yes  Advised patient that once provider approves this either a nurse or  will return their call?:   Would the patient like a call back, or a response through their [facility] portal?: call  Comments:

## 2024-01-11 ENCOUNTER — TELEPHONE (OUTPATIENT)
Dept: PRIMARY CARE CLINIC | Facility: CLINIC | Age: 69
End: 2024-01-11
Payer: MEDICARE

## 2024-01-11 DIAGNOSIS — Z01.00 ROUTINE EYE EXAM: Primary | ICD-10-CM

## 2024-02-08 ENCOUNTER — OFFICE VISIT (OUTPATIENT)
Dept: PRIMARY CARE CLINIC | Facility: CLINIC | Age: 69
End: 2024-02-08
Payer: MEDICARE

## 2024-02-08 VITALS
SYSTOLIC BLOOD PRESSURE: 130 MMHG | OXYGEN SATURATION: 97 % | BODY MASS INDEX: 25.78 KG/M2 | RESPIRATION RATE: 18 BRPM | WEIGHT: 160.38 LBS | HEART RATE: 74 BPM | HEIGHT: 66 IN | DIASTOLIC BLOOD PRESSURE: 60 MMHG

## 2024-02-08 DIAGNOSIS — J43.8 OTHER EMPHYSEMA: ICD-10-CM

## 2024-02-08 DIAGNOSIS — Z13.220 ENCOUNTER FOR LIPID SCREENING FOR CARDIOVASCULAR DISEASE: ICD-10-CM

## 2024-02-08 DIAGNOSIS — M25.511 ACUTE PAIN OF RIGHT SHOULDER: ICD-10-CM

## 2024-02-08 DIAGNOSIS — Z13.6 ENCOUNTER FOR LIPID SCREENING FOR CARDIOVASCULAR DISEASE: ICD-10-CM

## 2024-02-08 DIAGNOSIS — Z79.899 ENCOUNTER FOR LONG-TERM CURRENT USE OF MEDICATION: ICD-10-CM

## 2024-02-08 DIAGNOSIS — R59.0 LOCALIZED ENLARGED LYMPH NODES: ICD-10-CM

## 2024-02-08 DIAGNOSIS — Z12.5 SCREENING FOR PROSTATE CANCER: ICD-10-CM

## 2024-02-08 DIAGNOSIS — F17.200 CURRENT EVERY DAY SMOKER: ICD-10-CM

## 2024-02-08 DIAGNOSIS — Z87.891 PERSONAL HISTORY OF NICOTINE DEPENDENCE: ICD-10-CM

## 2024-02-08 DIAGNOSIS — Z72.0 TOBACCO USE: ICD-10-CM

## 2024-02-08 DIAGNOSIS — R59.0 CERVICAL LYMPHADENOPATHY: Primary | ICD-10-CM

## 2024-02-08 PROCEDURE — 3078F DIAST BP <80 MM HG: CPT | Mod: CPTII,S$GLB,, | Performed by: INTERNAL MEDICINE

## 2024-02-08 PROCEDURE — 1126F AMNT PAIN NOTED NONE PRSNT: CPT | Mod: CPTII,S$GLB,, | Performed by: INTERNAL MEDICINE

## 2024-02-08 PROCEDURE — 1159F MED LIST DOCD IN RCRD: CPT | Mod: CPTII,S$GLB,, | Performed by: INTERNAL MEDICINE

## 2024-02-08 PROCEDURE — 3044F HG A1C LEVEL LT 7.0%: CPT | Mod: CPTII,S$GLB,, | Performed by: INTERNAL MEDICINE

## 2024-02-08 PROCEDURE — 1160F RVW MEDS BY RX/DR IN RCRD: CPT | Mod: CPTII,S$GLB,, | Performed by: INTERNAL MEDICINE

## 2024-02-08 PROCEDURE — 3288F FALL RISK ASSESSMENT DOCD: CPT | Mod: CPTII,S$GLB,, | Performed by: INTERNAL MEDICINE

## 2024-02-08 PROCEDURE — 99999 PR PBB SHADOW E&M-EST. PATIENT-LVL IV: CPT | Mod: PBBFAC,,, | Performed by: INTERNAL MEDICINE

## 2024-02-08 PROCEDURE — 1101F PT FALLS ASSESS-DOCD LE1/YR: CPT | Mod: CPTII,S$GLB,, | Performed by: INTERNAL MEDICINE

## 2024-02-08 PROCEDURE — 3008F BODY MASS INDEX DOCD: CPT | Mod: CPTII,S$GLB,, | Performed by: INTERNAL MEDICINE

## 2024-02-08 PROCEDURE — 3075F SYST BP GE 130 - 139MM HG: CPT | Mod: CPTII,S$GLB,, | Performed by: INTERNAL MEDICINE

## 2024-02-08 PROCEDURE — 99214 OFFICE O/P EST MOD 30 MIN: CPT | Mod: S$GLB,,, | Performed by: INTERNAL MEDICINE

## 2024-02-08 RX ORDER — AZITHROMYCIN 250 MG/1
TABLET, FILM COATED ORAL
Qty: 6 TABLET | Refills: 0 | Status: SHIPPED | OUTPATIENT
Start: 2024-02-08 | End: 2024-02-12

## 2024-02-08 RX ORDER — MELOXICAM 15 MG/1
15 TABLET ORAL DAILY
Qty: 30 TABLET | Refills: 0 | Status: SHIPPED | OUTPATIENT
Start: 2024-02-08 | End: 2024-04-26

## 2024-02-08 NOTE — PROGRESS NOTES
Subjective:       Patient ID: Manoj Elizabeth is a 68 y.o. male.    Chief Complaint: Mass (Lump on neck)    HPI   patient visit today for follow-up he has history of hypertension COPD still smoking  patient visit today with complaint of a swollen gland in the right side of his neck for about 2 week nontender he deny coughing congestion does have a mild sore throat no fever chill night sweats weight loss he has history of ETOH use in the past deny any in nausea vomiting diarrhea constipation a no short of breath no chest pain no weight loss no night sweats no other swollen glands no abdominal pain  Review of Systems   Constitutional:  Negative for unexpected weight change.   HENT:  Positive for congestion.    Respiratory:  Negative for shortness of breath.    Cardiovascular:  Negative for chest pain.   Gastrointestinal:  Negative for abdominal pain.   Musculoskeletal:  Positive for arthralgias.   Allergic/Immunologic:        A swollen gland right side of the neck       Objective:      Physical Exam  Vitals and nursing note reviewed.   Constitutional:       General: He is not in acute distress.     Appearance: He is well-developed.   HENT:      Head: Normocephalic and atraumatic.      Right Ear: External ear normal.      Left Ear: External ear normal.      Nose: Nose normal.   Eyes:      Conjunctiva/sclera: Conjunctivae normal.      Pupils: Pupils are equal, round, and reactive to light.   Neck:      Thyroid: No thyromegaly.      Comments: Nontender and lost lymph node on the right submandibular anterior about 1.5 cm mobile no palpable mass no lesion in the oral cavity  Cardiovascular:      Rate and Rhythm: Normal rate and regular rhythm.      Heart sounds: Normal heart sounds. No murmur heard.     No friction rub. No gallop.   Pulmonary:      Effort: Pulmonary effort is normal. No respiratory distress.      Breath sounds: Normal breath sounds. No wheezing.   Abdominal:      General: Bowel sounds are normal. There  is no distension.      Palpations: Abdomen is soft.      Tenderness: There is no abdominal tenderness.   Musculoskeletal:         General: No tenderness or deformity. Normal range of motion.      Cervical back: Normal range of motion and neck supple.   Lymphadenopathy:      Cervical: No cervical adenopathy.   Skin:     General: Skin is warm and dry.      Findings: No erythema or rash.   Neurological:      Mental Status: He is alert and oriented to person, place, and time.   Psychiatric:         Thought Content: Thought content normal.         Judgment: Judgment normal.         Assessment:       1. Cervical lymphadenopathy    2. Acute pain of right shoulder    3. Encounter for lipid screening for cardiovascular disease    4. Screening for prostate cancer    5. Encounter for long-term current use of medication    6. Tobacco use    7. Localized enlarged lymph nodes    8. Current every day smoker    9. Personal history of nicotine dependence    10. Other emphysema        Plan:       Cervical lymphadenopathy  Comments:  Patient is high-risk with history of smoking and EtOH will get CT scan and blood test  Orders:  -     azithromycin (Z-IAN) 250 MG tablet; 2 tabs by mouth day 1, then 1 tab by mouth daily x 4 days  Dispense: 6 tablet; Refill: 0    Acute pain of right shoulder  -     meloxicam (MOBIC) 15 MG tablet; Take 1 tablet (15 mg total) by mouth once daily.  Dispense: 30 tablet; Refill: 0  -     CBC Auto Differential; Future; Expected date: 02/08/2024  -     Comprehensive Metabolic Panel; Future; Expected date: 02/08/2024    Encounter for lipid screening for cardiovascular disease  -     Lipid Panel; Future; Expected date: 02/08/2024    Screening for prostate cancer  -     PSA, Screening; Future; Expected date: 02/08/2024  -     Urinalysis; Future; Expected date: 02/08/2024    Encounter for long-term current use of medication  -     Hemoglobin A1C; Future; Expected date: 02/08/2024    Tobacco use  Comments:  Discussed  smoking sensation patient think about it  Orders:  -     CT Chest Lung Screening Low Dose; Future; Expected date: 02/08/2024  -     Ambulatory referral/consult to Smoking Cessation Program; Future; Expected date: 02/18/2024    Localized enlarged lymph nodes  -     CT Soft Tissue Neck WO Contrast; Future; Expected date: 02/08/2024    Current every day smoker  -     CT Chest Lung Screening Low Dose; Future; Expected date: 02/08/2024    Personal history of nicotine dependence  -     CT Chest Lung Screening Low Dose; Future; Expected date: 02/08/2024    Other emphysema  Comments:  Continue with albuterol metered-dose inhaler p.r.n.        Medication List with Changes/Refills   New Medications    AZITHROMYCIN (Z-IAN) 250 MG TABLET    2 tabs by mouth day 1, then 1 tab by mouth daily x 4 days   Current Medications    ALBUTEROL (VENTOLIN HFA) 90 MCG/ACTUATION INHALER    Inhale 2 puffs into the lungs every 6 (six) hours as needed for Wheezing. Rescue   Changed and/or Refilled Medications    Modified Medication Previous Medication    MELOXICAM (MOBIC) 15 MG TABLET meloxicam (MOBIC) 15 MG tablet       Take 1 tablet (15 mg total) by mouth once daily.    Take 1 tablet by mouth once daily

## 2024-02-23 ENCOUNTER — NURSE TRIAGE (OUTPATIENT)
Dept: ADMINISTRATIVE | Facility: CLINIC | Age: 69
End: 2024-02-23
Payer: MEDICARE

## 2024-02-23 ENCOUNTER — OFFICE VISIT (OUTPATIENT)
Dept: PRIMARY CARE CLINIC | Facility: CLINIC | Age: 69
End: 2024-02-23
Payer: MEDICARE

## 2024-02-23 VITALS
HEART RATE: 76 BPM | WEIGHT: 154.75 LBS | RESPIRATION RATE: 18 BRPM | HEIGHT: 66 IN | BODY MASS INDEX: 24.87 KG/M2 | OXYGEN SATURATION: 96 % | SYSTOLIC BLOOD PRESSURE: 130 MMHG | DIASTOLIC BLOOD PRESSURE: 70 MMHG

## 2024-02-23 DIAGNOSIS — R59.0 LOCALIZED ENLARGED LYMPH NODES: Primary | ICD-10-CM

## 2024-02-23 DIAGNOSIS — I70.0 AORTIC ATHEROSCLEROSIS: ICD-10-CM

## 2024-02-23 DIAGNOSIS — R59.0 CERVICAL LYMPHADENOPATHY: ICD-10-CM

## 2024-02-23 DIAGNOSIS — F10.220: ICD-10-CM

## 2024-02-23 DIAGNOSIS — Z72.0 TOBACCO USE: ICD-10-CM

## 2024-02-23 PROCEDURE — 1160F RVW MEDS BY RX/DR IN RCRD: CPT | Mod: CPTII,S$GLB,, | Performed by: INTERNAL MEDICINE

## 2024-02-23 PROCEDURE — 3008F BODY MASS INDEX DOCD: CPT | Mod: CPTII,S$GLB,, | Performed by: INTERNAL MEDICINE

## 2024-02-23 PROCEDURE — 3288F FALL RISK ASSESSMENT DOCD: CPT | Mod: CPTII,S$GLB,, | Performed by: INTERNAL MEDICINE

## 2024-02-23 PROCEDURE — 3044F HG A1C LEVEL LT 7.0%: CPT | Mod: CPTII,S$GLB,, | Performed by: INTERNAL MEDICINE

## 2024-02-23 PROCEDURE — 99213 OFFICE O/P EST LOW 20 MIN: CPT | Mod: S$GLB,,, | Performed by: INTERNAL MEDICINE

## 2024-02-23 PROCEDURE — 1126F AMNT PAIN NOTED NONE PRSNT: CPT | Mod: CPTII,S$GLB,, | Performed by: INTERNAL MEDICINE

## 2024-02-23 PROCEDURE — 3078F DIAST BP <80 MM HG: CPT | Mod: CPTII,S$GLB,, | Performed by: INTERNAL MEDICINE

## 2024-02-23 PROCEDURE — 99999 PR PBB SHADOW E&M-EST. PATIENT-LVL IV: CPT | Mod: PBBFAC,,, | Performed by: INTERNAL MEDICINE

## 2024-02-23 PROCEDURE — 1101F PT FALLS ASSESS-DOCD LE1/YR: CPT | Mod: CPTII,S$GLB,, | Performed by: INTERNAL MEDICINE

## 2024-02-23 PROCEDURE — 1159F MED LIST DOCD IN RCRD: CPT | Mod: CPTII,S$GLB,, | Performed by: INTERNAL MEDICINE

## 2024-02-23 PROCEDURE — 3075F SYST BP GE 130 - 139MM HG: CPT | Mod: CPTII,S$GLB,, | Performed by: INTERNAL MEDICINE

## 2024-02-23 NOTE — TELEPHONE ENCOUNTER
Spoke with Ofe who states patient did an x-ray yesterday.  Ofe states  has to decide if the patient needs a biopsy.  Patient has a lump on the right side of his neck (tennis ball size).  Ofe states the patient also has a lump in the groin (painless).  Patient states the lump in his neck started 1 month ago.  The lump in the groin started 1.5 years ago.  Advised that patient be seen within 3 days per protocol.  Appointment made with Dr. Carter for today @ 9:30 am. Advised that patient call back with worsening symptoms.  Ofe verbalized understanding.   Reason for Disposition   Small swelling or lump present > 1 week    Additional Information   Negative: Sounds like a life-threatening emergency to the triager   Negative: Hernia suspected (bulge in groin or abdomen) and painful or vomiting   Negative: Swollen lump in groin and pulsating (like heartbeat)   Negative: Patient sounds very sick or weak to the triager   Negative: SEVERE pain (e.g., excruciating)   Negative: Swelling is painful to touch AND fever   Negative: Swelling is red and fever   Negative: Swelling is red and size > 2 inches (5.0 cm)   Negative: Swelling is painful to touch and no fever   Negative: Looks like a boil, infected sore, deep ulcer or other infected rash    Protocols used: Skin Lump or Localized Swelling-A-OH

## 2024-02-25 PROBLEM — F10.220: Status: ACTIVE | Noted: 2024-02-25

## 2024-02-26 NOTE — PROGRESS NOTES
Subjective:       Patient ID: Manoj Elizabeth is a 68 y.o. male.    Chief Complaint: Follow-up (Ct scans)    Follow-up      Pt visit today for f/u he is doing better the swollen gland rt neck better labs ness normal no new c/o CT scan neck reactive lymph node lipid profile normal  Review of Systems    Objective:      Physical Exam  Vitals and nursing note reviewed.   Constitutional:       General: He is not in acute distress.     Appearance: He is well-developed.   HENT:      Head: Normocephalic and atraumatic.      Right Ear: External ear normal.      Left Ear: External ear normal.      Nose: Nose normal.   Eyes:      Conjunctiva/sclera: Conjunctivae normal.      Pupils: Pupils are equal, round, and reactive to light.   Neck:      Thyroid: No thyromegaly.      Comments: Enlarged rt submandibular lymph node much better  Cardiovascular:      Rate and Rhythm: Normal rate and regular rhythm.      Heart sounds: Normal heart sounds. No murmur heard.     No friction rub. No gallop.   Pulmonary:      Effort: Pulmonary effort is normal. No respiratory distress.      Breath sounds: Normal breath sounds. No wheezing.   Abdominal:      General: Bowel sounds are normal. There is no distension.      Palpations: Abdomen is soft.      Tenderness: There is no abdominal tenderness.   Musculoskeletal:         General: No tenderness or deformity. Normal range of motion.      Cervical back: Normal range of motion and neck supple.   Lymphadenopathy:      Cervical: No cervical adenopathy.   Skin:     General: Skin is warm and dry.      Capillary Refill: Capillary refill takes less than 2 seconds.      Findings: No erythema or rash.   Neurological:      Mental Status: He is alert and oriented to person, place, and time.   Psychiatric:         Thought Content: Thought content normal.         Judgment: Judgment normal.         Assessment:       1. Localized enlarged lymph nodes    2. Cervical lymphadenopathy    3. Tobacco use    4. Aortic  atherosclerosis    5. Acute alcoholic intoxication in alcoholism without complication, episodic drinking behavior        Plan:       Localized enlarged lymph nodes  Comments:  resolving no further w/u needed  Orders:  -     Ambulatory referral/consult to ENT; Future; Expected date: 03/01/2024    Cervical lymphadenopathy  Comments:  resloving  Orders:  -     Ambulatory referral/consult to ENT; Future; Expected date: 03/01/2024    Tobacco use  Comments:  pt try to quit smoke    Aortic atherosclerosis    Acute alcoholic intoxication in alcoholism without complication, episodic drinking behavior  Comments:  pt has been sober doing well helping his wife take care house work        Medication List with Changes/Refills   Current Medications    ALBUTEROL (VENTOLIN HFA) 90 MCG/ACTUATION INHALER    Inhale 2 puffs into the lungs every 6 (six) hours as needed for Wheezing. Rescue    MELOXICAM (MOBIC) 15 MG TABLET    Take 1 tablet (15 mg total) by mouth once daily.

## 2024-04-25 DIAGNOSIS — M25.511 ACUTE PAIN OF RIGHT SHOULDER: ICD-10-CM

## 2024-04-25 NOTE — TELEPHONE ENCOUNTER
No care due was identified.  [facility] Embedded Care Due Messages. Reference number: [id number].   4/25/2024 6:53:37 AM CDT

## 2024-04-26 RX ORDER — MELOXICAM 15 MG/1
15 TABLET ORAL
Qty: 30 TABLET | Refills: 0 | Status: SHIPPED | OUTPATIENT
Start: 2024-04-26 | End: 2024-05-22

## 2024-05-03 PROBLEM — F10.920 ALCOHOLIC INTOXICATION WITHOUT COMPLICATION: Status: ACTIVE | Noted: 2024-02-25

## 2024-05-16 ENCOUNTER — TELEPHONE (OUTPATIENT)
Dept: PRIMARY CARE CLINIC | Facility: CLINIC | Age: 69
End: 2024-05-16
Payer: MEDICARE

## 2024-05-16 NOTE — TELEPHONE ENCOUNTER
----- Message from Mona Jose sent at 5/16/2024  2:06 PM CDT -----  Contact: [phone]  1MEDICALADVICE     Patient is calling for Medical Advice regarding: pt wife calling one behalf saying she needs a call back about appt that's fro tomorrow     How long has patient had these symptoms:    Pharmacy name and phone#:    Would like response via [employer]: call back     Comments:

## 2024-05-16 NOTE — TELEPHONE ENCOUNTER
----- Message from Sierra Villalta sent at 5/16/2024  1:10 PM CDT -----  Contact: Wife, Ofe, [phone]  1MEDICALADVICE     Patient is calling for Medical Advice regarding: Lump on his neck    How long has patient had these symptoms: 3 days    Pharmacy name and phone#:   [facility] [address]  [place], [place] - [address]  [address] [facility] [address]  Phone: [phone] Fax: [phone]       Would like response via [email or web address]:  Call back    Comments: Calling for an appointment, none available. Please call him. Thanks.

## 2024-05-17 ENCOUNTER — OFFICE VISIT (OUTPATIENT)
Dept: PRIMARY CARE CLINIC | Facility: CLINIC | Age: 69
End: 2024-05-17
Payer: MEDICARE

## 2024-05-17 VITALS
WEIGHT: 154 LBS | OXYGEN SATURATION: 96 % | HEART RATE: 94 BPM | HEIGHT: 66 IN | SYSTOLIC BLOOD PRESSURE: 130 MMHG | RESPIRATION RATE: 18 BRPM | DIASTOLIC BLOOD PRESSURE: 80 MMHG | BODY MASS INDEX: 24.75 KG/M2

## 2024-05-17 DIAGNOSIS — Z13.6 ENCOUNTER FOR LIPID SCREENING FOR CARDIOVASCULAR DISEASE: ICD-10-CM

## 2024-05-17 DIAGNOSIS — I88.9 CERVICAL LYMPHADENITIS: ICD-10-CM

## 2024-05-17 DIAGNOSIS — F41.9 ANXIETY: ICD-10-CM

## 2024-05-17 DIAGNOSIS — F10.10 ALCOHOL ABUSE: ICD-10-CM

## 2024-05-17 DIAGNOSIS — M25.511 ACUTE PAIN OF RIGHT SHOULDER: ICD-10-CM

## 2024-05-17 DIAGNOSIS — F10.929 ALCOHOLIC INTOXICATION WITH COMPLICATION: Primary | ICD-10-CM

## 2024-05-17 DIAGNOSIS — K59.00 CONSTIPATION, UNSPECIFIED CONSTIPATION TYPE: ICD-10-CM

## 2024-05-17 DIAGNOSIS — Z13.220 ENCOUNTER FOR LIPID SCREENING FOR CARDIOVASCULAR DISEASE: ICD-10-CM

## 2024-05-17 DIAGNOSIS — R25.1 TREMOR: ICD-10-CM

## 2024-05-17 DIAGNOSIS — F19.939: ICD-10-CM

## 2024-05-17 PROCEDURE — 1159F MED LIST DOCD IN RCRD: CPT | Mod: CPTII,S$GLB,, | Performed by: INTERNAL MEDICINE

## 2024-05-17 PROCEDURE — 96372 THER/PROPH/DIAG INJ SC/IM: CPT | Mod: S$GLB,,, | Performed by: INTERNAL MEDICINE

## 2024-05-17 PROCEDURE — 99999 PR PBB SHADOW E&M-EST. PATIENT-LVL V: CPT | Mod: PBBFAC,,, | Performed by: INTERNAL MEDICINE

## 2024-05-17 PROCEDURE — 3044F HG A1C LEVEL LT 7.0%: CPT | Mod: CPTII,S$GLB,, | Performed by: INTERNAL MEDICINE

## 2024-05-17 PROCEDURE — 1100F PTFALLS ASSESS-DOCD GE2>/YR: CPT | Mod: CPTII,S$GLB,, | Performed by: INTERNAL MEDICINE

## 2024-05-17 PROCEDURE — 99214 OFFICE O/P EST MOD 30 MIN: CPT | Mod: 25,S$GLB,, | Performed by: INTERNAL MEDICINE

## 2024-05-17 PROCEDURE — 3075F SYST BP GE 130 - 139MM HG: CPT | Mod: CPTII,S$GLB,, | Performed by: INTERNAL MEDICINE

## 2024-05-17 PROCEDURE — 1160F RVW MEDS BY RX/DR IN RCRD: CPT | Mod: CPTII,S$GLB,, | Performed by: INTERNAL MEDICINE

## 2024-05-17 PROCEDURE — 3079F DIAST BP 80-89 MM HG: CPT | Mod: CPTII,S$GLB,, | Performed by: INTERNAL MEDICINE

## 2024-05-17 PROCEDURE — 3008F BODY MASS INDEX DOCD: CPT | Mod: CPTII,S$GLB,, | Performed by: INTERNAL MEDICINE

## 2024-05-17 PROCEDURE — 1125F AMNT PAIN NOTED PAIN PRSNT: CPT | Mod: CPTII,S$GLB,, | Performed by: INTERNAL MEDICINE

## 2024-05-17 PROCEDURE — 3288F FALL RISK ASSESSMENT DOCD: CPT | Mod: CPTII,S$GLB,, | Performed by: INTERNAL MEDICINE

## 2024-05-17 RX ORDER — CHLORDIAZEPOXIDE HYDROCHLORIDE 25 MG/1
25 CAPSULE, GELATIN COATED ORAL 2 TIMES DAILY PRN
Qty: 20 CAPSULE | Refills: 0 | Status: SHIPPED | OUTPATIENT
Start: 2024-05-17 | End: 2024-06-14

## 2024-05-17 RX ORDER — LANOLIN ALCOHOL/MO/W.PET/CERES
100 CREAM (GRAM) TOPICAL DAILY
Qty: 30 TABLET | Refills: 2 | Status: SHIPPED | OUTPATIENT
Start: 2024-05-17

## 2024-05-17 RX ORDER — MELOXICAM 15 MG/1
15 TABLET ORAL
Qty: 30 TABLET | Refills: 0 | Status: CANCELLED | OUTPATIENT
Start: 2024-05-17

## 2024-05-17 RX ORDER — AMOXICILLIN AND CLAVULANATE POTASSIUM 875; 125 MG/1; MG/1
1 TABLET, FILM COATED ORAL EVERY 12 HOURS
Qty: 20 TABLET | Refills: 0 | Status: SHIPPED | OUTPATIENT
Start: 2024-05-17 | End: 2024-06-14 | Stop reason: SDUPTHER

## 2024-05-17 RX ORDER — CYANOCOBALAMIN 1000 UG/ML
2000 INJECTION, SOLUTION INTRAMUSCULAR; SUBCUTANEOUS
Status: COMPLETED | OUTPATIENT
Start: 2024-05-17 | End: 2024-05-17

## 2024-05-17 RX ORDER — CYANOCOBALAMIN 1000 UG/ML
2000 INJECTION, SOLUTION INTRAMUSCULAR; SUBCUTANEOUS
Status: SHIPPED | OUTPATIENT
Start: 2024-05-17

## 2024-05-17 RX ADMIN — CYANOCOBALAMIN 2000 MCG: 1000 INJECTION, SOLUTION INTRAMUSCULAR; SUBCUTANEOUS at 03:05

## 2024-05-17 NOTE — PROGRESS NOTES
Verified pt ID using name and . Genoveva. Administered B 12  in IM in left dorsalgluteal per physician order using aseptic technique. Aspirated and no blood return noted. Pt tolerated well with no adverse reactions noted.

## 2024-05-17 NOTE — PROGRESS NOTES
Subjective:       Patient ID: Manoj Elizabeth is a 69 y.o. male.    Chief Complaint: Shoulder Pain (Right/), Mass (In neck), and Constipation    HPI  patient visit today for ER follow-up he is here with his wife who reports that patient was seen in the ER about 2 weeks ago for alcohol intoxication he was given IV fluids and sent home patient is still drinking alcohol and having some tremor he also complained of chronic right shoulder pain and questionable enlarged lymph node on the right side of the neck and constipation patient presently awake alert able to answer question but appear nervous and slight agitated he does not have short of breath chest pain nausea vomiting abdominal pain he absolutely does not want to go into the hospital or detox unit spoke with patient's wife if condition gets worse she just had to call ambulance corner office to be see patient see request to her medication to detox at home will try Librium and thiamine and multivitamin and oral hydration and consult social Service  Review of Systems   Constitutional:  Negative for unexpected weight change.   Respiratory:  Positive for shortness of breath.    Cardiovascular:  Negative for chest pain and palpitations.   Gastrointestinal:  Negative for abdominal pain.   Musculoskeletal:  Negative for arthralgias.   Psychiatric/Behavioral:  Positive for dysphoric mood.        Objective:      Physical Exam  Vitals and nursing note reviewed.   Constitutional:       General: He is not in acute distress.     Appearance: He is well-developed.   HENT:      Head: Normocephalic and atraumatic.      Right Ear: External ear normal.      Left Ear: External ear normal.      Nose: Nose normal.   Eyes:      Conjunctiva/sclera: Conjunctivae normal.      Pupils: Pupils are equal, round, and reactive to light.   Neck:      Thyroid: No thyromegaly.   Cardiovascular:      Rate and Rhythm: Normal rate and regular rhythm.      Heart sounds: Normal heart sounds. No murmur  heard.     No friction rub. No gallop.   Pulmonary:      Effort: Pulmonary effort is normal. No respiratory distress.      Breath sounds: Normal breath sounds. No wheezing.   Abdominal:      General: Bowel sounds are normal. There is no distension.      Palpations: Abdomen is soft.      Tenderness: There is no abdominal tenderness.   Musculoskeletal:         General: No tenderness or deformity. Normal range of motion.      Cervical back: Normal range of motion and neck supple.   Lymphadenopathy:      Cervical: No cervical adenopathy.   Skin:     General: Skin is dry.      Findings: No erythema or rash.   Neurological:      Mental Status: He is alert and oriented to person, place, and time.   Psychiatric:      Comments: Pt nervous sl agitation follow commands well refuse ER or detox want try at home         Assessment:       1. Alcoholic intoxication with complication    2. Acute pain of right shoulder    3. Cervical lymphadenitis    4. Tremor    5. Constipation, unspecified constipation type    6. Encounter for lipid screening for cardiovascular disease    7. Anxiety    8. Tremor due to substance withdrawal    9. Alcohol abuse        Plan:       Alcoholic intoxication with complication  -     cyanocobalamin injection 2,000 mcg  -     chlordiazepoxide (LIBRIUM) 25 MG Cap; Take 1 capsule (25 mg total) by mouth 2 (two) times daily as needed (anxiety trtemor).  Dispense: 20 capsule; Refill: 0  -     amoxicillin-clavulanate 875-125mg (AUGMENTIN) 875-125 mg per tablet; Take 1 tablet by mouth every 12 (twelve) hours.  Dispense: 20 tablet; Refill: 0  -     thiamine 100 MG tablet; Take 1 tablet (100 mg total) by mouth once daily.  Dispense: 30 tablet; Refill: 2  -     cyanocobalamin injection 2,000 mcg  -     Comprehensive Metabolic Panel; Future; Expected date: 05/17/2024  -     Lipid Panel; Future; Expected date: 05/17/2024  -     Ambulatory referral/consult to Social Work; Future; Expected date: 05/20/2024  -      Ambulatory referral/consult to Outpatient Case Management    Acute pain of right shoulder  -     Urinalysis; Future; Expected date: 05/17/2024  -     X-Ray Chest PA And Lateral; Future; Expected date: 05/17/2024    Cervical lymphadenitis  -     amoxicillin-clavulanate 875-125mg (AUGMENTIN) 875-125 mg per tablet; Take 1 tablet by mouth every 12 (twelve) hours.  Dispense: 20 tablet; Refill: 0    Tremor  -     chlordiazepoxide (LIBRIUM) 25 MG Cap; Take 1 capsule (25 mg total) by mouth 2 (two) times daily as needed (anxiety trtemor).  Dispense: 20 capsule; Refill: 0  -     cyanocobalamin injection 2,000 mcg  -     CBC Auto Differential; Future; Expected date: 05/17/2024  -     Comprehensive Metabolic Panel; Future; Expected date: 05/17/2024    Constipation, unspecified constipation type  -     TSH; Future; Expected date: 05/17/2024  -     T4, Free; Future; Expected date: 05/17/2024  -     X-Ray Abdomen Flat And Erect; Future; Expected date: 05/17/2024  -     linaCLOtide (LINZESS) 290 mcg Cap capsule; Take 1 capsule (290 mcg total) by mouth before breakfast.  Dispense: 30 capsule; Refill: 3    Encounter for lipid screening for cardiovascular disease  -     Lipid Panel; Future; Expected date: 05/17/2024    Anxiety  -     Ambulatory referral/consult to Social Work; Future; Expected date: 05/20/2024    Tremor due to substance withdrawal  -     Ambulatory referral/consult to Social Work; Future; Expected date: 05/20/2024  -     Ambulatory referral/consult to Outpatient Case Management    Alcohol abuse  -     Ambulatory referral/consult to Outpatient Case Management        Medication List with Changes/Refills   New Medications    AMOXICILLIN-CLAVULANATE 875-125MG (AUGMENTIN) 875-125 MG PER TABLET    Take 1 tablet by mouth every 12 (twelve) hours.    CHLORDIAZEPOXIDE (LIBRIUM) 25 MG CAP    Take 1 capsule (25 mg total) by mouth 2 (two) times daily as needed (anxiety trtemor).    LINACLOTIDE (LINZESS) 290 MCG CAP CAPSULE    Take  1 capsule (290 mcg total) by mouth before breakfast.    THIAMINE 100 MG TABLET    Take 1 tablet (100 mg total) by mouth once daily.   Current Medications    ALBUTEROL (VENTOLIN HFA) 90 MCG/ACTUATION INHALER    Inhale 2 puffs into the lungs every 6 (six) hours as needed for Wheezing. Rescue    MELOXICAM (MOBIC) 15 MG TABLET    Take 1 tablet by mouth once daily

## 2024-05-20 ENCOUNTER — TELEPHONE (OUTPATIENT)
Dept: PRIMARY CARE CLINIC | Facility: CLINIC | Age: 69
End: 2024-05-20
Payer: MEDICARE

## 2024-05-20 NOTE — TELEPHONE ENCOUNTER
----- Message from Dagoberto Carter MD sent at 5/19/2024  8:00 PM CDT -----  Pt is being referred to  and

## 2024-05-21 DIAGNOSIS — M25.511 ACUTE PAIN OF RIGHT SHOULDER: ICD-10-CM

## 2024-05-21 NOTE — TELEPHONE ENCOUNTER
No care due was identified.  [facility] Embedded Care Due Messages. Reference number: [id number].   5/21/2024 6:55:15 AM CDT

## 2024-05-22 RX ORDER — MELOXICAM 15 MG/1
15 TABLET ORAL
Qty: 30 TABLET | Refills: 0 | Status: SHIPPED | OUTPATIENT
Start: 2024-05-22 | End: 2024-06-14

## 2024-05-30 ENCOUNTER — PATIENT OUTREACH (OUTPATIENT)
Dept: ADMINISTRATIVE | Facility: OTHER | Age: 69
End: 2024-05-30
Payer: MEDICARE

## 2024-05-30 NOTE — PROGRESS NOTES
CHW - Outreach Attempt    Community Health Worker left a voicemail message for 1st attempt to contact patient regarding: community resources    Community Health Worker to attempt to contact patient on: 5/30/24

## 2024-05-31 ENCOUNTER — NURSE TRIAGE (OUTPATIENT)
Dept: ADMINISTRATIVE | Facility: CLINIC | Age: 69
End: 2024-05-31
Payer: MEDICARE

## 2024-05-31 NOTE — TELEPHONE ENCOUNTER
OOC TREMAINE Thakur wife. Ofe Croft s/o calling about .  He is sleeping.  Right Shoulder pain. Not taking the meloxincab.   He drinks instead pain meds.    Lump in neck and was given ATB for lump and it went down.  Wife is with him to get taking care of.    Been going to PT.  Been having this for 3 years.  Pain 8/10 Care advise is to be seen in office today or tomorrow.  Will check for appt.   Dr. Carter no appts  Can you see him today or tomorrow, I dont see any appts?  Please reach out to patient.   Offered VV,  OAC visit;.  For any new or worse symptoms to callback OOC    Reason for Disposition   Patient wants to be seen    Additional Information   Negative: Shock suspected (e.g., cold/pale/clammy skin, too weak to stand, low BP, rapid pulse)   Negative: Similar pain previously and it was from 'heart attack'   Negative: Similar pain previously and it was from 'angina' and not relieved by nitroglycerin   Negative: Sounds like a life-threatening emergency to the triager   Negative: Difficulty breathing or unusual sweating (e.g., sweating without exertion)   Negative: Pain lasting > 5 minutes and pain also present in chest  (Exception: Pain is clearly made worse by movement.)   Negative: Age > 40 and no obvious cause and pain even when not moving the arm  (Exception: Pain is clearly made worse by moving arm or bending neck.)   Negative: Red area or streak and fever   Negative: Swollen joint and fever   Negative: Entire arm is swollen   Negative: Patient sounds very sick or weak to the triager   Negative: SEVERE pain (e.g., excruciating, unable to do any normal activities)   Negative: Shoulder pains with exertion (e.g., walking) and pain goes away on resting and not present now   Negative: Painful rash with multiple small blisters grouped together (i.e., dermatomal distribution or 'band' or 'stripe')   Negative: Looks like a boil, infected sore, deep ulcer or other infected rash (spreading redness, pus)    Negative: Localized rash is very painful (no fever)   Negative: Weakness (i.e., loss of strength) in hand or fingers  (Exception: Not truly weak; hand feels weak because of pain.)   Negative: Numbness (i.e., loss of sensation) in hand or fingers   Negative: Unable to use arm at all and because of shoulder pain or stiffness    Protocols used: Shoulder Pain-A-[place]

## 2024-06-03 NOTE — PROGRESS NOTES
CHW - Outreach Attempt    Community Health Worker left a voicemail message for 2nd attempt to contact patient regarding: community resources    Community Health Worker to attempt to contact patient on: 6/3/24

## 2024-06-04 NOTE — PROGRESS NOTES
"CHW - Outreach Attempt    Community Health Worker left a voicemail message for 3rd attempt to contact patient regarding: community resources    Community Health Worker to attempt to contact patient on: 6/4/2024    CHW - Unable to Contact    Community Health Worker to close episode at this time due to three missed attempts for patient contact.       Only number listed is apparently pt's wife's cell phone she answered today and stated "stop calling me"   And hung up     CHW will close episode after three unsuccessful attempts  "

## 2024-06-10 ENCOUNTER — TELEPHONE (OUTPATIENT)
Dept: PRIMARY CARE CLINIC | Facility: CLINIC | Age: 69
End: 2024-06-10
Payer: MEDICARE

## 2024-06-10 DIAGNOSIS — I88.9 CERVICAL LYMPHADENITIS: Primary | ICD-10-CM

## 2024-06-10 NOTE — TELEPHONE ENCOUNTER
Called and spoke with patient in regards to he have a lump on his neck and he said it is getting worse. He want to know who he can see for this and if he can be referred to someone about it.

## 2024-06-10 NOTE — TELEPHONE ENCOUNTER
----- Message from Malika Pedroza sent at 6/10/2024  7:56 AM CDT -----  Contact: [phone]  Patient called, stated that he have a lump on his neck that pcp is aware of, and need to find out who to see for that. Would like to be refer. Patient said that his lump is getting worse. Thank you.

## 2024-06-11 ENCOUNTER — TELEPHONE (OUTPATIENT)
Dept: PRIMARY CARE CLINIC | Facility: CLINIC | Age: 69
End: 2024-06-11
Payer: MEDICARE

## 2024-06-11 NOTE — TELEPHONE ENCOUNTER
----- Message from Guillermina Posey sent at 6/11/2024  1:53 PM CDT -----  Contact: Ofe/Friend [phone]  [email or web address]     Patient is calling for Medical Advice regarding: lump on his neck    How long has patient had these symptoms:on going/getting worse    Pharmacy name and phone#:   [facility] - [place] - [address] [place] [address]  Phone: [phone] Fax: [phone]    Would like response via [employer]:  call back    Comments:  stated that the pcp is aware of, and need to find out who to see for that. Would like to be refer. Patient said that his lump is getting worse.     Please call and advise.    Thank You

## 2024-06-11 NOTE — TELEPHONE ENCOUNTER
Called and spoke with patient in regards to a referral was placed for ENT and someone will be contacting him to schedule an appointment.

## 2024-06-14 ENCOUNTER — OFFICE VISIT (OUTPATIENT)
Dept: PRIMARY CARE CLINIC | Facility: CLINIC | Age: 69
End: 2024-06-14
Payer: MEDICARE

## 2024-06-14 VITALS
RESPIRATION RATE: 18 BRPM | WEIGHT: 149.69 LBS | SYSTOLIC BLOOD PRESSURE: 124 MMHG | OXYGEN SATURATION: 98 % | BODY MASS INDEX: 24.06 KG/M2 | HEIGHT: 66 IN | HEART RATE: 62 BPM | DIASTOLIC BLOOD PRESSURE: 70 MMHG

## 2024-06-14 DIAGNOSIS — F10.929 ALCOHOLIC INTOXICATION WITH COMPLICATION: ICD-10-CM

## 2024-06-14 DIAGNOSIS — M25.511 ACUTE PAIN OF RIGHT SHOULDER: ICD-10-CM

## 2024-06-14 DIAGNOSIS — I88.9 CERVICAL LYMPHADENITIS: ICD-10-CM

## 2024-06-14 DIAGNOSIS — R59.0 LOCALIZED ENLARGED LYMPH NODES: Primary | ICD-10-CM

## 2024-06-14 PROCEDURE — 99999 PR PBB SHADOW E&M-EST. PATIENT-LVL IV: CPT | Mod: PBBFAC,,, | Performed by: INTERNAL MEDICINE

## 2024-06-14 PROCEDURE — 99214 OFFICE O/P EST MOD 30 MIN: CPT | Mod: 25,S$GLB,, | Performed by: INTERNAL MEDICINE

## 2024-06-14 PROCEDURE — 1125F AMNT PAIN NOTED PAIN PRSNT: CPT | Mod: CPTII,S$GLB,, | Performed by: INTERNAL MEDICINE

## 2024-06-14 PROCEDURE — 96372 THER/PROPH/DIAG INJ SC/IM: CPT | Mod: S$GLB,,, | Performed by: INTERNAL MEDICINE

## 2024-06-14 PROCEDURE — 1101F PT FALLS ASSESS-DOCD LE1/YR: CPT | Mod: CPTII,S$GLB,, | Performed by: INTERNAL MEDICINE

## 2024-06-14 PROCEDURE — 3008F BODY MASS INDEX DOCD: CPT | Mod: CPTII,S$GLB,, | Performed by: INTERNAL MEDICINE

## 2024-06-14 PROCEDURE — 3044F HG A1C LEVEL LT 7.0%: CPT | Mod: CPTII,S$GLB,, | Performed by: INTERNAL MEDICINE

## 2024-06-14 PROCEDURE — 3288F FALL RISK ASSESSMENT DOCD: CPT | Mod: CPTII,S$GLB,, | Performed by: INTERNAL MEDICINE

## 2024-06-14 PROCEDURE — 3074F SYST BP LT 130 MM HG: CPT | Mod: CPTII,S$GLB,, | Performed by: INTERNAL MEDICINE

## 2024-06-14 PROCEDURE — 3078F DIAST BP <80 MM HG: CPT | Mod: CPTII,S$GLB,, | Performed by: INTERNAL MEDICINE

## 2024-06-14 PROCEDURE — 1160F RVW MEDS BY RX/DR IN RCRD: CPT | Mod: CPTII,S$GLB,, | Performed by: INTERNAL MEDICINE

## 2024-06-14 PROCEDURE — 1159F MED LIST DOCD IN RCRD: CPT | Mod: CPTII,S$GLB,, | Performed by: INTERNAL MEDICINE

## 2024-06-14 RX ORDER — KETOROLAC TROMETHAMINE 10 MG/1
10 TABLET, FILM COATED ORAL EVERY 6 HOURS
Qty: 20 TABLET | Refills: 0 | Status: SHIPPED | OUTPATIENT
Start: 2024-06-14 | End: 2024-06-19

## 2024-06-14 RX ORDER — DOXYCYCLINE 100 MG/1
100 CAPSULE ORAL EVERY 12 HOURS
Qty: 30 CAPSULE | Refills: 0 | Status: SHIPPED | OUTPATIENT
Start: 2024-06-14 | End: 2024-06-24

## 2024-06-14 RX ORDER — AMOXICILLIN AND CLAVULANATE POTASSIUM 875; 125 MG/1; MG/1
1 TABLET, FILM COATED ORAL EVERY 12 HOURS
Qty: 30 TABLET | Refills: 0 | Status: SHIPPED | OUTPATIENT
Start: 2024-06-14

## 2024-06-14 RX ORDER — MELOXICAM 15 MG/1
15 TABLET ORAL
Qty: 30 TABLET | Refills: 0 | Status: CANCELLED | OUTPATIENT
Start: 2024-06-14

## 2024-06-14 RX ORDER — LORAZEPAM 0.5 MG/1
0.5 TABLET ORAL EVERY 12 HOURS PRN
Qty: 30 TABLET | Refills: 1 | Status: SHIPPED | OUTPATIENT
Start: 2024-06-14 | End: 2024-07-14

## 2024-06-14 NOTE — PROGRESS NOTES
Verified pt ID using name and . Ansley. Administered Rocephin in left dorsalgluteal per physician order using aseptic technique. Aspirated and no blood return noted. Pt tolerated well with no adverse reactions noted.

## 2024-06-14 NOTE — PROGRESS NOTES
Subjective:       Patient ID: Manoj Elizabeth is a 69 y.o. male.    Chief Complaint: Follow-up (4 month) and Neck Pain    HPI  Pt with h/o HTN ETOH abuse pt is here with his wife who report pt c/o rt enck pain and swollen gland had CT scan few month ago sl enlarged reactive lymphnode better with antibiotic but now increasing swelling and tender and rt side throat hurt no wt loss no fever chill sob cp pt is high risk due smoking and still drinking alcohol he ha sappt with ENT in 2 weeks . His wife report try help pt to quit etoh but he get nervous anxiety without alcohol does not want to go to ER or in pt detoxification  Whenhe takes antibiotic it help with the pain and feels better  Review of Systems   Constitutional:  Negative for unexpected weight change.   HENT:  Positive for sore throat.    Respiratory:  Negative for shortness of breath.    Cardiovascular:  Negative for chest pain.   Gastrointestinal:  Negative for abdominal pain.   Musculoskeletal:  Negative for arthralgias and back pain.   Psychiatric/Behavioral:  Positive for dysphoric mood.        Objective:      Physical Exam  Vitals and nursing note reviewed.   Constitutional:       General: He is not in acute distress.     Appearance: He is well-developed.   HENT:      Head: Normocephalic and atraumatic.      Right Ear: External ear normal.      Left Ear: External ear normal.      Nose: Nose normal.   Eyes:      Conjunctiva/sclera: Conjunctivae normal.      Pupils: Pupils are equal, round, and reactive to light.   Neck:      Thyroid: No thyromegaly.      Comments: Tender enlarged submandibular lymphnode vs mass  Cardiovascular:      Rate and Rhythm: Normal rate and regular rhythm.      Heart sounds: Normal heart sounds. No murmur heard.     No friction rub. No gallop.   Pulmonary:      Effort: Pulmonary effort is normal. No respiratory distress.      Breath sounds: Normal breath sounds. No wheezing.   Abdominal:      General: Bowel sounds are normal.  There is no distension.      Palpations: Abdomen is soft.      Tenderness: There is no abdominal tenderness.   Musculoskeletal:         General: No tenderness or deformity. Normal range of motion.      Cervical back: Normal range of motion and neck supple.   Lymphadenopathy:      Cervical: No cervical adenopathy.   Skin:     General: Skin is dry.      Findings: No erythema or rash.   Neurological:      Mental Status: He is alert and oriented to person, place, and time.   Psychiatric:         Mood and Affect: Mood normal.         Assessment:       1. Localized enlarged lymph nodes    2. Acute pain of right shoulder    3. Alcoholic intoxication with complication    4. Cervical lymphadenitis        Plan:       Localized enlarged lymph nodes  Comments:  keep appt with ENT in 2 weeks consider short course f po steroid if not better  Orders:  -     CT Soft Tissue Neck WO Contrast; Future; Expected date: 06/14/2024    Acute pain of right shoulder  -     ketorolac (TORADOL) 10 mg tablet; Take 1 tablet (10 mg total) by mouth every 6 (six) hours. for 5 days  Dispense: 20 tablet; Refill: 0    Alcoholic intoxication with complication  -     LORazepam (ATIVAN) 0.5 MG tablet; Take 1 tablet (0.5 mg total) by mouth every 12 (twelve) hours as needed for Anxiety.  Dispense: 30 tablet; Refill: 1  -     amoxicillin-clavulanate 875-125mg (AUGMENTIN) 875-125 mg per tablet; Take 1 tablet by mouth every 12 (twelve) hours.  Dispense: 30 tablet; Refill: 0    Cervical lymphadenitis  -     ketorolac (TORADOL) 10 mg tablet; Take 1 tablet (10 mg total) by mouth every 6 (six) hours. for 5 days  Dispense: 20 tablet; Refill: 0  -     doxycycline (VIBRAMYCIN) 100 MG Cap; Take 1 capsule (100 mg total) by mouth every 12 (twelve) hours. for 10 days  Dispense: 30 capsule; Refill: 0  -     amoxicillin-clavulanate 875-125mg (AUGMENTIN) 875-125 mg per tablet; Take 1 tablet by mouth every 12 (twelve) hours.  Dispense: 30 tablet; Refill: 0  -     cefTRIAXone  (Rocephin) 1 g in LIDOcaine HCL 10 mg/ml (1%) 4 mL IM only syringe        Medication List with Changes/Refills   New Medications    DOXYCYCLINE (VIBRAMYCIN) 100 MG CAP    Take 1 capsule (100 mg total) by mouth every 12 (twelve) hours. for 10 days    KETOROLAC (TORADOL) 10 MG TABLET    Take 1 tablet (10 mg total) by mouth every 6 (six) hours. for 5 days    LORAZEPAM (ATIVAN) 0.5 MG TABLET    Take 1 tablet (0.5 mg total) by mouth every 12 (twelve) hours as needed for Anxiety.   Current Medications    ALBUTEROL (VENTOLIN HFA) 90 MCG/ACTUATION INHALER    Inhale 2 puffs into the lungs every 6 (six) hours as needed for Wheezing. Rescue    LINACLOTIDE (LINZESS) 290 MCG CAP CAPSULE    Take 1 capsule (290 mcg total) by mouth before breakfast.    THIAMINE 100 MG TABLET    Take 1 tablet (100 mg total) by mouth once daily.   Changed and/or Refilled Medications    Modified Medication Previous Medication    AMOXICILLIN-CLAVULANATE 875-125MG (AUGMENTIN) 875-125 MG PER TABLET amoxicillin-clavulanate 875-125mg (AUGMENTIN) 875-125 mg per tablet       Take 1 tablet by mouth every 12 (twelve) hours.    Take 1 tablet by mouth every 12 (twelve) hours.   Discontinued Medications    CHLORDIAZEPOXIDE (LIBRIUM) 25 MG CAP    Take 1 capsule (25 mg total) by mouth 2 (two) times daily as needed (anxiety trtemor).    MELOXICAM (MOBIC) 15 MG TABLET    Take 1 tablet by mouth once daily

## 2024-06-28 DIAGNOSIS — M25.511 ACUTE PAIN OF RIGHT SHOULDER: ICD-10-CM

## 2024-06-28 RX ORDER — MELOXICAM 15 MG/1
15 TABLET ORAL
Qty: 30 TABLET | Refills: 0 | Status: SHIPPED | OUTPATIENT
Start: 2024-06-28

## 2024-06-28 NOTE — TELEPHONE ENCOUNTER
No care due was identified.  [facility] Embedded Care Due Messages. Reference number: [id number].   6/28/2024 6:57:44 AM CDT

## 2024-06-28 NOTE — TELEPHONE ENCOUNTER
Refill Routing Note   Medication(s) are not appropriate for processing by [facility] for the following reason(s):        Outside of protocol    [facility] action(s):  Route             Appointments  past 12m or future 3m with PCP    Date Provider   Last Visit   6/14/2024 Dagoberto Carter MD   Next Visit   8/9/2024 Dagoberto Carter MD   ED visits in past 90 days: 1        Note composed:9:37 AM 06/28/2024

## 2024-07-02 ENCOUNTER — OFFICE VISIT (OUTPATIENT)
Dept: OTOLARYNGOLOGY | Facility: CLINIC | Age: 69
End: 2024-07-02
Payer: MEDICARE

## 2024-07-02 ENCOUNTER — TELEPHONE (OUTPATIENT)
Dept: OTOLARYNGOLOGY | Facility: CLINIC | Age: 69
End: 2024-07-02

## 2024-07-02 VITALS
DIASTOLIC BLOOD PRESSURE: 68 MMHG | HEIGHT: 66 IN | HEART RATE: 73 BPM | WEIGHT: 145.5 LBS | SYSTOLIC BLOOD PRESSURE: 120 MMHG | BODY MASS INDEX: 23.38 KG/M2

## 2024-07-02 DIAGNOSIS — C13.9 MALIGNANT NEOPLASM OF CONTIGUOUS SITES OF HYPOPHARYNX: Primary | ICD-10-CM

## 2024-07-02 DIAGNOSIS — C32.1 MALIGNANT NEOPLASM OF SUPRAGLOTTIS: ICD-10-CM

## 2024-07-02 DIAGNOSIS — R22.1 NECK MASS: ICD-10-CM

## 2024-07-02 DIAGNOSIS — C77.0 MALIGNANT NEOPLASM METASTATIC TO ANTERIOR CERVICAL LYMPH NODE: ICD-10-CM

## 2024-07-02 PROCEDURE — 1125F AMNT PAIN NOTED PAIN PRSNT: CPT | Mod: CPTII,S$GLB,, | Performed by: OTOLARYNGOLOGY

## 2024-07-02 PROCEDURE — 3288F FALL RISK ASSESSMENT DOCD: CPT | Mod: CPTII,S$GLB,, | Performed by: OTOLARYNGOLOGY

## 2024-07-02 PROCEDURE — 99999 PR PBB SHADOW E&M-EST. PATIENT-LVL V: CPT | Mod: PBBFAC,,, | Performed by: OTOLARYNGOLOGY

## 2024-07-02 PROCEDURE — 1159F MED LIST DOCD IN RCRD: CPT | Mod: CPTII,S$GLB,, | Performed by: OTOLARYNGOLOGY

## 2024-07-02 PROCEDURE — 1101F PT FALLS ASSESS-DOCD LE1/YR: CPT | Mod: CPTII,S$GLB,, | Performed by: OTOLARYNGOLOGY

## 2024-07-02 PROCEDURE — 3078F DIAST BP <80 MM HG: CPT | Mod: CPTII,S$GLB,, | Performed by: OTOLARYNGOLOGY

## 2024-07-02 PROCEDURE — 3074F SYST BP LT 130 MM HG: CPT | Mod: CPTII,S$GLB,, | Performed by: OTOLARYNGOLOGY

## 2024-07-02 PROCEDURE — 31575 DIAGNOSTIC LARYNGOSCOPY: CPT | Mod: S$GLB,,, | Performed by: OTOLARYNGOLOGY

## 2024-07-02 PROCEDURE — 99205 OFFICE O/P NEW HI 60 MIN: CPT | Mod: 25,S$GLB,, | Performed by: OTOLARYNGOLOGY

## 2024-07-02 PROCEDURE — 3044F HG A1C LEVEL LT 7.0%: CPT | Mod: CPTII,S$GLB,, | Performed by: OTOLARYNGOLOGY

## 2024-07-02 PROCEDURE — 3008F BODY MASS INDEX DOCD: CPT | Mod: CPTII,S$GLB,, | Performed by: OTOLARYNGOLOGY

## 2024-07-02 RX ORDER — PREDNISONE 50 MG/1
TABLET ORAL
Qty: 3 TABLET | Refills: 0 | Status: SHIPPED | OUTPATIENT
Start: 2024-07-02

## 2024-07-02 RX ORDER — DIPHENHYDRAMINE HCL 50 MG
CAPSULE ORAL
Qty: 1 CAPSULE | Refills: 0 | Status: SHIPPED | OUTPATIENT
Start: 2024-07-02

## 2024-07-02 NOTE — PROCEDURES
"Laryngoscopy    Date/Time: 7/2/2024 2:00 PM    Performed by: Nestor Espinoza MD  Authorized by: Nestor Espinoza MD    Time out: Immediately prior to procedure a "time out" was called to verify the correct patient, procedure, equipment, support staff and site/side marked as required.    Consent Done?:  Yes (Verbal)  Anesthesia:     Local anesthetic:  Other    Patient tolerance:  Patient tolerated the procedure well with no immediate complications    Decongestion performed?: Yes    Laryngoscopy:     Areas examined:  Nasal cavities, nasopharynx, oropharynx, hypopharynx, larynx and vocal cords  Larynx/hypopharynx:      Transnasal flexible video endoscopy with disposable Storz scope through the more patent left nares performed after topical Afrin and lidocaine revealing a mucosal lesion of the nasopharyngeal hypo pharyngeal junction left of the midline and a separate large bulky tumor 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  Unable to appreciate the right true vocal cord or any movement.  Anterior commissure not well visualized.  Left vocal cord moving well but movement impeded by the bulk of the arytenoid involving mass on the right side.                     "

## 2024-07-02 NOTE — PROGRESS NOTES
[facility]    Subjective:      Patient: Manoj Elizabeth Patient PCP: Dagoberto Carter MD         :  1955     Sex:  male      MRN:  [id number]          Date of Visit: 2024      Chief Complaint: Mass (Right neck mass. Pt states about a 6 weeks ago it started out as a small bump and got progressively bigger so 2 months ago he got an antibiotic and it went down. Pt states after it came back after the antibiotic it got larger and has not stopped growing causing pain. Pt did not get CT Scan done because they forgot. Pt states he has hoarse voice, hard time eating food, choking him./RSI: 25/45 GERD /VHI:33/40)      Patient ID: Manoj Elizabeth is a 69 y.o. male     Patient is a  current smoker with a past medical history of emphysema, IBS, anxiety referred to me by Dr. Dagoberto Carter in consultation for right neck mass 1st noticed as a small bump slowly enlarging which improved but did not resolve with antibiotic.  Painful.  CT neck ordered 2024 but not completed.  Also has a hoarse voice and choking sensation on food.  Significant vocal impairment with a VHI 10 of 33/40 and significant throat symptoms with an RSI of 25.  Denies hemoptysis.  Weight loss of 4 kg over the last 6-7 weeks.    2024 CT neck soft tissue without contrast  images reviewed today reports upper normal right lower neck node quit likely reactive pansinusitis and spondylosis of the cervical spine.  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  There is notable inflammatory sinusitis affecting bilateral ethmoids and maxillary sinuses sparing the frontal sinuses and sphenoid sinuses.  No erosive or destructive sinus lesion/changes are noted.    Wt Readings from Last 3 Encounters:   24 1333 66 kg (145 lb 8.1 oz)   24 0941 67.9 kg (149 lb 11.1 oz)    05/17/24 1407 69.8 kg (153 lb 15.9 oz)         Labs:  WBC   Date Value Ref Range Status   02/08/2024 6.69 3.90 - 12.70 K/uL Final     Hemoglobin   Date Value Ref Range Status   02/08/2024 13.9 (L) 14.0 - 18.0 g/dL Final     Platelets   Date Value Ref Range Status   02/08/2024 246 150 - 450 K/uL Final     Creatinine   Date Value Ref Range Status   02/08/2024 0.8 0.5 - 1.4 mg/dL Final     Hemoglobin A1C   Date Value Ref Range Status   02/08/2024 4.9 4.0 - 5.6 % Final     Comment:     ADA Screening Guidelines:  5.7-6.4%  Consistent with prediabetes  >or=6.5%  Consistent with diabetes    High levels of fetal hemoglobin interfere with the HbA1C  assay. Heterozygous hemoglobin variants (HbS, HgC, etc)do  not significantly interfere with this assay.   However, presence of multiple variants may affect accuracy.         Past Medical History  He has a past medical history of Hypertension and Inguinal hernia.    Family / Surgical / Social History  His family history includes Colon cancer in his father; Hypertension in his mother.    Past Surgical History:   Procedure Laterality Date    COLONOSCOPY      COLONOSCOPY N/A 7/6/2021    Procedure: COLONOSCOPY;  Surgeon: Abril Rincon MD;  Location: [facility];  Service: Endoscopy;  Laterality: N/A;       Social History     Tobacco Use    Smoking status: Every Day     Current packs/day: 1.00     Average packs/day: 1 pack/day for 50.0 years (50.0 ttl pk-yrs)     Types: Cigarettes     Passive exposure: Current    Smokeless tobacco: Never    Tobacco comments:     everyday   Substance and Sexual Activity    Alcohol use: Yes     Alcohol/week: 5.0 standard drinks of alcohol     Types: 5 Shots of liquor per week     Comment: 1 pint a day    Drug use: Never    Sexual activity: Yes     Partners: Female       Medications  He has a current medication list which includes the following prescription(s): meloxicam, thiamine, albuterol, amoxicillin-clavulanate 875-125mg, linaclotide, and  "lorazepam, and the following Facility-Administered Medications: cyanocobalamin.      Allergies  Review of patient's allergies indicates:   Allergen Reactions    Bupropion Other (See Comments)     nightmares    Iodine and iodide containing products Swelling     shellfish    Shellfish containing products Hives       All medications, allergies, and past history have been reviewed.    Objective:      Vitals:      5/17/2024     2:07 PM 6/14/2024     9:41 AM 7/2/2024     1:33 PM   Vitals - 1 value per visit   SYSTOLIC 130 124 120   DIASTOLIC 80 70 68   Pulse 94 62 73   Resp 18 18    SPO2 96 % 98 %    Weight (lb) 153.99 149.69 145.5   Weight (kg) 69.85 67.9 66   Height 5' 6" (1.676 m) 5' 6" (1.676 m) 5' 6" (1.676 m)   BMI (Calculated) 24.9 24.2 23.5   Pain Score Ten Four Nine       Body surface area is 1.75 meters squared.    Physical Exam:    GENERAL  APPEARANCE -  alert, appears stated age, and cooperative  BARRIER(S) TO COMMUNICATION -  none VOICE - raspy and breathy    INTEGUMENTARY  no suspicious head and neck lesions    HEENT  HEAD: Normocephalic, without obvious abnormality, atraumatic  FACE: INSPECTION - Symmetric, no signs of trauma, no suspicious lesion(s)      STRENGTH - facial symmetry intact     PALPATION -  No masses     SALIVARY GLANDS - non-tender with no appreciable mass    NECK/THYROID: normal atraumatic, no neck masses, normal thyroid, no jvd    EYES  Normal occular alignment and mobility with no visible nystagmus at rest    EARS/NOSE/MOUTH/THROAT  EARS  PINNAE AND EXTERNAL EARS - no suspicious lesion OTOSCOPIC EXAM (surgical microscopy was not used for visualization/instrumentation): EAR EXAM - Normal ear canals, tympanic membranes and mobility, and middle ear spaces bilaterally.  HEARING - grossly intact to voice/finger rub    NOSE AND SINUSES  EXTERNAL NOSE - Grossly normal for age/sex  SEPTUM - normal/no obstruction on anterior exam without decongestion TURBINATES - within normal limits MUCOSA - " within normal limits     MOUTH AND THROAT   ORAL CAVITY, LIPS, TEETH, GUMS & TONGUE - moist, no suspicious lesions  OROPHARYNX /TONSILS/PHARYNGEAL WALLS/HYPOPHARYNX - no erythema or exudates  NASOPHARYNX - limited mirror exam - unable to visualize due to anatomy/gag  LARYNX -  - limited mirror exam - unable to visualize due to anatomy/gag      CHEST AND LUNG   INSPECTION & AUSCULTATION - normal effort, no stridor    CARDIOVASCULAR  AUSCULTATION & PERIPHERAL VASCULAR - regular rate and rhythm.    NEUROLOGIC  MENTAL STATUS - alert, interactive CRANIAL NERVES - normal    LYMPHATIC  HEAD AND NECK - bulky tense proximally 6-7 cm mass involving level 3 of the neck on the right side tender with normal overlying skin; SUPRACLAVICULAR - deferred      Procedure(s):  Flexible laryngoscopy performed.  See procedure note.    Transnasal flexible video endoscopy with disposable Storz scope through the more patent left nares performed after topical Afrin and lidocaine revealing a mucosal lesion of the nasopharyngeal hypo pharyngeal junction left of the midline and a separate large bulky tumor 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  Unable to appreciate the right true vocal cord or any movement.  Anterior commissure not well visualized.  Left vocal cord moving well but movement impeded by the bulk of the arytenoid involving mass on the right side.          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  Steroid and Benadryl prep for the CT scan as ordered.      Consultation ASAP  with head and neck surgery at [facility] for evaluation for operative biopsy with possible tracheostomy.  Discussed the likely liu laryngo pharyngectomy with free flap reconstruction followed by radiation therapy with or without chemo being favored by tumor board over conservative therapy with chemoradiation as more likely to cure.  This is courses depending upon initial imaging, biopsy findings, and PET scanning.      Message sent to Niyah Shaw at the cancer center and Dr. Wellington.    Smoking cessation and slowly weaning off alcohol over the course of the next 1-2 weeks discussed at length.    Follow up as needed locally.          Voice recognition software was used in the creation of this note/communication and any sound-alike errors which may have occurred from its use should be taken in context when interpreting.  If such errors prevent a clear understanding of the note/communication, please contact the office for clarification.

## 2024-07-02 NOTE — TELEPHONE ENCOUNTER
Called for pt, spoke to MsDen Ofe. Advised that Dr. Espinoza sent in 2 prescriptions for pt to take in preparation for CT Scan with contrast. Advised that Dr. Espinoza sent in:   Disp Refills Start End TAMELA   predniSONE (DELTASONE) 50 MG Tab 3 tablet 0 7/2/2024 -- No   Sig: Take 50mg by mouth at 13 hours, 7 hours, and 1 hour before contrast administration.   Sent to pharmacy as: predniSONE (DELTASONE) 50 MG Tab   Class: Normal   Order: [id number]   Date/Time Signed: 7/2/2024 14:45       E-Prescribing Status: Receipt confirmed by pharmacy (7/2/2024  2:46 PM CDT)     diphenhydrAMINE (BENADRYL) 50 MG capsule 1 capsule 0 7/2/2024 -- No   Sig: Take 50mg by mouth 1 hour before contrast administration.   Sent to pharmacy as: diphenhydrAMINE (BENADRYL) 50 MG capsule   Class: Normal   Order: [id number]   Date/Time Signed: 7/2/2024 14:45       E-Prescribing Status: Receipt confirmed by pharmacy (7/2/2024  2:46 PM CDT)     Advised to let us know if they have any issues getting medications today. Ms. Ofe verbalized understanding. Thanks, Marleni

## 2024-07-03 ENCOUNTER — TELEPHONE (OUTPATIENT)
Dept: OTOLARYNGOLOGY | Facility: CLINIC | Age: 69
End: 2024-07-03
Payer: MEDICARE

## 2024-07-03 NOTE — TELEPHONE ENCOUNTER
Spoke with pt's wife and scheduled appt for 7/15 at 1:30pm with Dr. Wellington.    ----- Message from Niyah Quiñones RN sent at 7/3/2024 10:06 AM CDT -----  Can you offer him an appt on 7/15 with [facility]? Or tues or thurs of that week  ----- Message -----  From: Nestor Espinoza MD  Sent: 7/2/2024   2:53 PM CDT  To: Niyah Quiñones RN; Salomon Wellington MD; #

## 2024-07-05 ENCOUNTER — TELEPHONE (OUTPATIENT)
Dept: OTOLARYNGOLOGY | Facility: CLINIC | Age: 69
End: 2024-07-05
Payer: MEDICARE

## 2024-07-05 NOTE — TELEPHONE ENCOUNTER
Spoke w/pt's ECOfe per call back msg received.  EC was wanting to see if pt can have pain medication sent out to pharmacy.  Informed that Dr. Espinoza usually does not prescribe pain medication, as well as informed EC that provider will be out of office for the better-half of two weeks.  Did advise that pt may alternate between tylenol/advil in the meantime for pain management; also advised that they may reach out to pt's PCP to see if pain medication can be obtained thru his office.  EC acknowledged; states that she will contact PCP to see this through.  No further ENT questions/concerns at this time.

## 2024-07-05 NOTE — TELEPHONE ENCOUNTER
----- Message from Héctor Tse sent at 7/5/2024  9:45 AM CDT -----  Type: Needs Medical Advice    Who Called:  Pt's friend    Pharmacy name and phone #:    [id number] - [place] - [email or web address]  [email or web address]  [place] [address]  Phone: [phone] Fax: [phone]    Best Call Back Number: [phone]    Additional Information: Pt's friend is calling to see what pain meds he can get as he is in pain in neck but has tumor in neck  and has cancer. Was recently seen by  Also, has apps in roughly 2 weeks but needs something for pain asap. Please call back to advise, Thanks!

## 2024-07-09 ENCOUNTER — OFFICE VISIT (OUTPATIENT)
Dept: OTOLARYNGOLOGY | Facility: CLINIC | Age: 69
End: 2024-07-09
Payer: MEDICARE

## 2024-07-09 VITALS
WEIGHT: 151.25 LBS | HEIGHT: 66 IN | DIASTOLIC BLOOD PRESSURE: 78 MMHG | SYSTOLIC BLOOD PRESSURE: 132 MMHG | BODY MASS INDEX: 24.31 KG/M2 | HEART RATE: 66 BPM

## 2024-07-09 DIAGNOSIS — C32.9 LARYNGEAL CARCINOMA: ICD-10-CM

## 2024-07-09 DIAGNOSIS — R22.1 NECK MASS: Primary | ICD-10-CM

## 2024-07-09 PROCEDURE — 99999 PR PBB SHADOW E&M-EST. PATIENT-LVL V: CPT | Mod: PBBFAC,,, | Performed by: STUDENT IN AN ORGANIZED HEALTH CARE EDUCATION/TRAINING PROGRAM

## 2024-07-09 PROCEDURE — 88305 TISSUE EXAM BY PATHOLOGIST: CPT | Performed by: PATHOLOGY

## 2024-07-09 PROCEDURE — 3075F SYST BP GE 130 - 139MM HG: CPT | Mod: CPTII,S$GLB,, | Performed by: STUDENT IN AN ORGANIZED HEALTH CARE EDUCATION/TRAINING PROGRAM

## 2024-07-09 PROCEDURE — 3044F HG A1C LEVEL LT 7.0%: CPT | Mod: CPTII,S$GLB,, | Performed by: STUDENT IN AN ORGANIZED HEALTH CARE EDUCATION/TRAINING PROGRAM

## 2024-07-09 PROCEDURE — 88173 CYTOPATH EVAL FNA REPORT: CPT | Performed by: PATHOLOGY

## 2024-07-09 PROCEDURE — 99215 OFFICE O/P EST HI 40 MIN: CPT | Mod: S$GLB,,, | Performed by: STUDENT IN AN ORGANIZED HEALTH CARE EDUCATION/TRAINING PROGRAM

## 2024-07-09 PROCEDURE — 1159F MED LIST DOCD IN RCRD: CPT | Mod: CPTII,S$GLB,, | Performed by: STUDENT IN AN ORGANIZED HEALTH CARE EDUCATION/TRAINING PROGRAM

## 2024-07-09 PROCEDURE — 1125F AMNT PAIN NOTED PAIN PRSNT: CPT | Mod: CPTII,S$GLB,, | Performed by: STUDENT IN AN ORGANIZED HEALTH CARE EDUCATION/TRAINING PROGRAM

## 2024-07-09 PROCEDURE — 3078F DIAST BP <80 MM HG: CPT | Mod: CPTII,S$GLB,, | Performed by: STUDENT IN AN ORGANIZED HEALTH CARE EDUCATION/TRAINING PROGRAM

## 2024-07-09 PROCEDURE — 3008F BODY MASS INDEX DOCD: CPT | Mod: CPTII,S$GLB,, | Performed by: STUDENT IN AN ORGANIZED HEALTH CARE EDUCATION/TRAINING PROGRAM

## 2024-07-09 RX ORDER — OXYCODONE HYDROCHLORIDE 5 MG/1
5 TABLET ORAL NIGHTLY
Qty: 20 TABLET | Refills: 0 | Status: SHIPPED | OUTPATIENT
Start: 2024-07-09

## 2024-07-09 RX ORDER — NICOTINE 7MG/24HR
1 PATCH, TRANSDERMAL 24 HOURS TRANSDERMAL DAILY
Qty: 1 PATCH | Refills: 3 | Status: SHIPPED | OUTPATIENT
Start: 2024-07-09

## 2024-07-09 NOTE — PROCEDURES
"Manoj Elizabeth is a 69 y.o. male patient.    Pulse: 66 (07/09/24 1047)  BP: 132/78 (07/09/24 1047)  Weight: 68.6 kg (151 lb 3.8 oz) (07/09/24 1047)  Height: 5' 6" (167.6 cm) (07/09/24 1047)       Procedures  The patient was examined and there was a palpable mass, 6 cm in right neck.      The patient was explained the nature of the procedure and risks, and a consent form was signed. A timeout was performed at 11:35am.    The skin was prepared with alcohol, and fine needle aspirate was performed. 1 pass was performed. Material was obtained, and results will follow in a separate report. The patient tolerated the procedure well.  Additional comments: none  Complications: none      7/9/2024    "

## 2024-07-09 NOTE — PROGRESS NOTES
Note to patients: In accordance with the  Century Cures Act, patients are now granted immediate electronic access to their medical records. This note is primarily intended for communication among medical professionals. As a result, it may incorporate medical terminology, abbreviations, or language that could appear blunt or unfamiliar. If you have questions about this document, we encourage you to discuss it with your physician.    [facility]  Head [facility] Ne[facility]    Patient: Manoj Elizabeth    : 1955    MRN: [id number]  Primary Care Provider: Dagoberto Carter MD  Referring Provider: Dr. Espinoza  Rad Onc: pending  Med Onc: pending  Date of Encounter: 2024    DIAGNOSIS:    Cancer Staging   Laryngeal carcinoma  Staging form: Larynx - Supraglottis, AJCC 8th Edition  - Clinical stage from 2024: Stage IVB (cT3, cN3b, cM0) - Signed by Swati Wills MD on 2024    CC:   Chief Complaint   Patient presents with    Mass     HPI:   Manoj Elizabeth is a 69 y.o. male current smoker with a past medical history significant for COPD, IBS, anxiety who is being seen today for right neck mass from Dr. Espinoza.  He first reported to his PCP in February with these complaints. The patient reports he received antibiotics with minimal response. The neck mass grew significantly larger in the past month.    Patient is having worsening pain. Denies fever, chills, night sweats. He has about a 10lb unintentional weight loss. Denies enlarged lymph nodes outside of the head or neck. + odynophagia, dysphagia, voice changes. Has switched to eating softer foods. Baseline cough (COPD).     Patient lives in [place].    TREATMENT HISTORY:  N/A    SUBSTANCE USE:  Smoking: current, 1 ppd for decades (cutting back to 5 cig/day)  Alcohol: current, previously every day    ALLERGIES:  Review of patient's allergies indicates:   Allergen Reactions    Iodine and iodide containing products Swelling      shellfish    Bupropion Other (See Comments)     nightmares    Shellfish containing products Hives         MEDICATIONS:    Current Outpatient Medications:     diphenhydrAMINE (BENADRYL) 50 MG capsule, Take 50mg by mouth 1 hour before contrast administration., Disp: 1 capsule, Rfl: 0    meloxicam (MOBIC) 15 MG tablet, Take 1 tablet by mouth once daily, Disp: 30 tablet, Rfl: 0    predniSONE (DELTASONE) 50 MG Tab, Take 50mg by mouth at 13 hours, 7 hours, and 1 hour before contrast administration., Disp: 3 tablet, Rfl: 0    thiamine 100 MG tablet, Take 1 tablet (100 mg total) by mouth once daily., Disp: 30 tablet, Rfl: 2    albuterol (VENTOLIN HFA) 90 mcg/actuation inhaler, Inhale 2 puffs into the lungs every 6 (six) hours as needed for Wheezing. Rescue (Patient not taking: Reported on 7/2/2024), Disp: 18 g, Rfl: 11    linaCLOtide (LINZESS) 290 mcg Cap capsule, Take 1 capsule (290 mcg total) by mouth before breakfast. (Patient not taking: Reported on 7/2/2024), Disp: 30 capsule, Rfl: 3    LORazepam (ATIVAN) 0.5 MG tablet, Take 1 tablet (0.5 mg total) by mouth every 12 (twelve) hours as needed for Anxiety. (Patient not taking: Reported on 7/2/2024), Disp: 30 tablet, Rfl: 1    nicotine (NICODERM CQ) 7 mg/24 hr, Place 1 patch onto the skin once daily., Disp: 1 patch, Rfl: 3    oxyCODONE (ROXICODONE) 5 MG immediate release tablet, Take 1 tablet (5 mg total) by mouth every evening., Disp: 20 tablet, Rfl: 0    Current Facility-Administered Medications:     cyanocobalamin injection 2,000 mcg, 2,000 mcg, Intramuscular, 1 time in Clinic/HOD,     PAST MEDICAL HISTORY:  Past Medical History:   Diagnosis Date    Hypertension     Inguinal hernia 2018    left        PAST SURGICAL HISTORY:  Past Surgical History:   Procedure Laterality Date    COLONOSCOPY      COLONOSCOPY N/A 7/6/2021    Procedure: COLONOSCOPY;  Surgeon: Abril Rincon MD;  Location: [facility];  Service: Endoscopy;  Laterality: N/A;        FAMILY HISTORY:  Family  "History   Problem Relation Name Age of Onset    Hypertension Mother      Colon cancer Father         SOCIAL HISTORY:  Social History     Socioeconomic History    Marital status: Single   Tobacco Use    Smoking status: Every Day     Current packs/day: 1.00     Average packs/day: 1 pack/day for 50.0 years (50.0 ttl pk-yrs)     Types: Cigarettes     Passive exposure: Current    Smokeless tobacco: Never    Tobacco comments:     everyday   Substance and Sexual Activity    Alcohol use: Yes     Alcohol/week: 5.0 standard drinks of alcohol     Types: 5 Shots of liquor per week     Comment: 1 pint a day    Drug use: Never    Sexual activity: Yes     Partners: Female     See above substance history    REVIEW OF SYSTEMS:   Comprehensive review of systems was discussed with the patient.     It is positive only for the above complaints.    PHYSICAL EXAMINATION:  Blood pressure 132/78, pulse 66, height 5' 6" (1.676 m), weight 68.6 kg (151 lb 3.8 oz).    Constitutional: Non-toxic appearing.   Psychiatric: Appropriate mood and affect. Cooperative.  Voice: Dysarthria with hoarseness and muffled voice, speaking in full sentences.   Neurologic: Cranial nerves grossly intact, no focal deficits.  Head and face: Salivary glands are not enlarged. Face is symmetric. CN VII strength intact.  Skin: No concerning skin lesions.   Eyes: Vision grossly intact, bilateral extraocular movements intact  Ears: Bilateral pinna, mastoid, external ear canal normal. Hearing intact.   Nose: External nose appears normal.   Lips: No ulcers or lesions  Oral cavity: Mucosa is pink and moist. Edentulous. Buccal mucosa, gingiva, anterior tongue, floor of mouth, and hard palate appear normal. No leukoplakia, erythroplakia, ulceration, masses or lesions.  Oropharynx: Mucosa is pink and moist. Soft palate and base of tongue are normal. Posterior pharyngeal wall normal. Tonsils are normal. No lesions.  Neck: 10cm right neck mass, tender to palpation, mobile " superior/inferior but not laterally.  No palpable thyroid enlargement or nodules.  Respiratory: Chest expansion symmetric, no audible stridor or stertor. Breathing is unlabored. No active cough.    CLINICAL PHOTOS:            PROCEDURES:    FLEXIBLE LARYNGOSCOPY  Provider: Swati Wills MD  Indication: Possible cancer  The patient was unable to tolerate mirror laryngoscopy.  The procedure was explained to the patient and verbal consent was obtained.   Anesthesia: topical lidocaine and neosynephrine applied within one or both nares with an atomizer    The scope was introduced in the usual fashion.    Findings:  Mucosa: normal  Inferior turbinates: no polypoid changes or hypertrophy  Septum: no lesion or ulcer  Middle meatus: no purulence or polypoid change  Nasopharynx:  Adenoids: normal  Fossa of Rosenmuller: normal  Eustachian tubes: normal  Superior and posterior pharyngeal walls: subcentimer lesion on posterior pharyngeal wall  Epiglottis, vallecula, and base of tongue: omega epiglottis, BoT and vallecula clear  Right ulcerative mass involving right arytenoid and right piriform  Right vocal cord paralysis, unclear if mass effect or paralysis  Laryngeal sensation appears diminished.     The scope was withdrawn. The patient tolerated the procedure well with no complications.      DATA REVIEWED:     LABORATORY:      Latest Ref Rng & Units 1/10/2023    12:25 PM 2/8/2024    11:22 AM 7/3/2024     8:13 AM   Thyroid Labs   TSH 0.400 - 4.000 uIU/mL   1.901    Free T4 0.71 - 1.51 ng/dL   1.15    Sodium 136 - 145 mmol/L 138  141  135    Potassium 3.5 - 5.1 mmol/L 5.1  5.2  4.6    Chloride 95 - 110 mmol/L 104  108  102    Carbon Dioxide 23 - 29 mmol/L 25  22  19    Glucose 70 - 110 mg/dL 89  95  106    Blood Urea Nitrogen 8 - 23 mg/dL 17  17  14    Creatinine 0.5 - 1.4 mg/dL 0.8  0.8  0.8    Calcium 8.7 - 10.5 mg/dL 9.8  9.6  10.4    Total Protein 6.0 - 8.4 g/dL 7.4  7.1  7.9    Albumin 3.5 - 5.2 g/dL 4.2  3.8  3.7    Total  Bilirubin 0.1 - 1.0 mg/dL 0.7  0.5  1.3    AST 10 - 40 U/L 30  24  405    ALT 10 - 44 U/L 18  14  378    Anion Gap 8 - 16 mmol/L 9  11  14    WBC 3.90 - 12.70 K/uL 7.42  6.69  11.00    RBC 4.60 - 6.20 M/uL 4.03  4.11  4.55    Hemoglobin 14.0 - 18.0 g/dL 13.5  13.9  15.3    Hematocrit 40.0 - 54.0 % 42.0  42.7  45.8    MCV 82 - 98 fL 104  104  101    MCH 27.0 - 31.0 pg 33.5  33.8  33.6    MCHC 32.0 - 36.0 g/dL 32.1  32.6  33.4    RDW 11.5 - 14.5 % 13.4  12.5  13.0    Platelets 150 - 450 K/uL 262  246  354    MPV 9.2 - 12.9 fL 11.3  11.0  10.2    Gran # 1.8 - 7.7 K/uL 3.9  3.5  9.2    Lymph # 1.0 - 4.8 K/uL 2.4  2.2  1.4    Mono # 0.3 - 1.0 K/uL 0.7  0.6  0.2    Eos # 0.0 - 0.5 K/uL 0.3  0.4  0.1    Baso # 0.00 - 0.20 K/uL 0.08  0.07  0.05    Gran % 38.0 - 73.0 % 52.1  52.0  84.0    Lymph % 18.0 - 48.0 % 32.1  32.7  12.6    Mono% 4.0 - 15.0 % 10.0  8.7  1.6    Eos % 0.0 - 8.0 % 4.4  5.5  0.8    Baso % 0.0 - 1.9 % 1.1  1.0  0.5         PATHOLOGY:  pending    IMAGING:  Findings most consistent with a supraglottic/hypopharyngeal mass (likely originating at the right aryepiglottic fold although there is prominent involvement of the right piriform sinus) that also protrudes through the thyro-arytenoid gap into the paraglottic space at the level of the right true cord.  No definite cartilaginous erosion.     Right level II and III adenopathy.     The right internal jugular vein is not visualized at the level of adenopathy although is patent above and below this region.    RADIOLOGY REVIEWED:  I have independently viewed and agree with the above images and reports which demonstrate arytenoid/piriform cancer as described, with very large right lymphadenopathy.    ASSESSMENT AND PLAN:  1. Neck mass    2. Laryngeal carcinoma         Manoj Elizabeth is a 69 y.o. male with laryngeal mass with bob disease that is clinically suggestive of T3N3b laryngeal cancer.  - We discussed that cancer is typically treated with three tools:  surgery, radiation, and chemotherapy. Some patients need one (single modality), other may need two or even all three. For this patient's cancer, the location makes surgery difficult without removing the whole voice box (larynx). Although the tumor is possibly amenable to vertical partial laryngectomy, I do not think the patient's pulmonary status would tolerate that procedure given his known COPD.  - We discussed that the primary mode of treatment would be chemotherapy and radiation therapy. We would plan for surgery only if the CCRT did not adequately treat the tumor.  - FNA performed by pathology today  - PET for staging  - Referral to rad onc and heme onc - patient lives in [place]  - Discussed importance of smoking cessation. Patient has cut back significantly. Offered counseling which he declined at this time. Agreed to nicotine patches, prescription sent  - Oxycodone prescription for cancer-related pain - will refer to palliative or pain if requiring larger dosages    Orders Placed This Encounter    NM PET CT FDG Skull Base to Mid Thigh    Ambulatory referral/consult to Radiation Oncology    Ambulatory referral/consult to Hematology / Oncology    Cytology -FNA-Pathologist performed    nicotine (NICODERM CQ) 7 mg/24 hr    oxyCODONE (ROXICODONE) 5 MG immediate release tablet        Patient encouraged to call with any questions, concerns, or new or worsening symptoms.     Follow up next week to discuss biopsy results and next steps

## 2024-07-10 LAB
ADEQUACY: ABNORMAL
FINAL PATHOLOGIC DIAGNOSIS: ABNORMAL

## 2024-07-15 ENCOUNTER — TELEPHONE (OUTPATIENT)
Dept: HEMATOLOGY/ONCOLOGY | Facility: CLINIC | Age: 69
End: 2024-07-15
Payer: MEDICARE

## 2024-07-16 ENCOUNTER — TELEPHONE (OUTPATIENT)
Dept: HEMATOLOGY/ONCOLOGY | Facility: CLINIC | Age: 69
End: 2024-07-16
Payer: MEDICARE

## 2024-07-16 ENCOUNTER — TELEPHONE (OUTPATIENT)
Dept: RADIATION THERAPY | Facility: HOSPITAL | Age: 69
End: 2024-07-16
Payer: MEDICARE

## 2024-07-16 NOTE — TELEPHONE ENCOUNTER
Called pt regarding 7/16 missed appt. Patient has been admitted to [facility]. 7/16 appt canceled per patient wife.

## 2024-07-16 NOTE — TELEPHONE ENCOUNTER
Spoke with Ms. Antony about today's appointment. States they needed assistance with transportation.  reached out to provide transportation and Ms. Antony stated they will  need to r/s all appointments today because they weren't aware.

## 2024-07-22 ENCOUNTER — TELEPHONE (OUTPATIENT)
Dept: RADIATION THERAPY | Facility: HOSPITAL | Age: 69
End: 2024-07-22
Payer: MEDICARE

## 2024-07-27 DIAGNOSIS — M25.511 ACUTE PAIN OF RIGHT SHOULDER: ICD-10-CM

## 2024-07-27 NOTE — TELEPHONE ENCOUNTER
No care due was identified.  [facility] Embedded Care Due Messages. Reference number: [id number].   7/27/2024 6:55:56 AM CDT

## 2024-07-29 NOTE — TELEPHONE ENCOUNTER
Refill Routing Note   Medication(s) are not appropriate for processing by [facility] for the following reason(s):        Outside of protocol    [facility] action(s):  Route               Appointments  past 12m or future 3m with PCP    Date Provider   Last Visit   6/14/2024 Dagoberto Carter MD   Next Visit   8/9/2024 Dagoberto Carter MD   ED visits in past 90 days: 1        Note composed:7:30 AM 07/29/2024              s

## 2024-07-30 RX ORDER — MELOXICAM 15 MG/1
15 TABLET ORAL
Qty: 30 TABLET | Refills: 0 | Status: SHIPPED | OUTPATIENT
Start: 2024-07-30

## 2024-08-04 DIAGNOSIS — F10.929 ALCOHOLIC INTOXICATION WITH COMPLICATION: ICD-10-CM

## 2024-08-05 RX ORDER — LANOLIN ALCOHOL/MO/W.PET/CERES
100 CREAM (GRAM) TOPICAL
Qty: 30 TABLET | Refills: 0 | Status: SHIPPED | OUTPATIENT
Start: 2024-08-05

## 2024-08-29 DIAGNOSIS — M25.511 ACUTE PAIN OF RIGHT SHOULDER: ICD-10-CM

## 2024-08-29 NOTE — TELEPHONE ENCOUNTER
Refill Routing Note   Medication(s) are not appropriate for processing by [facility] for the following reason(s):        Outside of protocol    [facility] action(s):  Route             Appointments  past 12m or future 3m with PCP    Date Provider   Last Visit   6/14/2024 Dagoberto Carter MD   Next Visit   Visit date not found Dagoberto Carter MD   ED visits in past 90 days: 0        Note composed:4:25 PM 08/29/2024

## 2024-08-29 NOTE — TELEPHONE ENCOUNTER
No care due was identified.  [facility] Embedded Care Due Messages. Reference number: [id number].   8/29/2024 6:54:55 AM CDT

## 2024-08-30 RX ORDER — MELOXICAM 15 MG/1
15 TABLET ORAL
Qty: 30 TABLET | Refills: 0 | Status: SHIPPED | OUTPATIENT
Start: 2024-08-30

## 2024-09-03 DIAGNOSIS — F10.929 ALCOHOLIC INTOXICATION WITH COMPLICATION: ICD-10-CM

## 2024-09-04 NOTE — TELEPHONE ENCOUNTER
Refill Routing Note   Medication(s) are not appropriate for processing by [facility] for the following reason(s):        Outside of protocol    [facility] action(s):  Route               Appointments  past 12m or future 3m with PCP    Date Provider   Last Visit   6/14/2024 Dagoberto Carter MD   Next Visit   Visit date not found Dagoberto Carter MD   ED visits in past 90 days: 0        Note composed:9:08 AM 09/04/2024

## 2024-09-05 RX ORDER — LANOLIN ALCOHOL/MO/W.PET/CERES
100 CREAM (GRAM) TOPICAL
Qty: 30 TABLET | Refills: 0 | Status: SHIPPED | OUTPATIENT
Start: 2024-09-05

## 2024-09-28 DIAGNOSIS — M25.511 ACUTE PAIN OF RIGHT SHOULDER: ICD-10-CM

## 2024-09-28 NOTE — TELEPHONE ENCOUNTER
No care due was identified.  [facility] Embedded Care Due Messages. Reference number: [id number].   9/28/2024 6:56:11 AM CDT

## 2024-09-30 RX ORDER — MELOXICAM 15 MG/1
15 TABLET ORAL
Qty: 30 TABLET | Refills: 0 | Status: SHIPPED | OUTPATIENT
Start: 2024-09-30

## 2024-09-30 NOTE — TELEPHONE ENCOUNTER
Refill Routing Note   Medication(s) are not appropriate for processing by [facility] for the following reason(s):      Medication outside of protocol    [facility] action(s):  Route Care Due:  None identified            Appointments  past 12m or future 3m with PCP    Date Provider   Last Visit   6/14/2024 Dagoberto Carter MD   Next Visit   Visit date not found Dagoberto Carter MD   ED visits in past 90 days: 0        Note composed:7:36 AM 09/30/2024

## 2024-10-26 DIAGNOSIS — M25.511 ACUTE PAIN OF RIGHT SHOULDER: ICD-10-CM

## 2024-10-28 RX ORDER — MELOXICAM 15 MG/1
15 TABLET ORAL
Qty: 30 TABLET | Refills: 0 | Status: SHIPPED | OUTPATIENT
Start: 2024-10-28

## 2024-10-29 DIAGNOSIS — F10.929 ALCOHOLIC INTOXICATION WITH COMPLICATION: ICD-10-CM

## 2024-10-30 RX ORDER — LANOLIN ALCOHOL/MO/W.PET/CERES
100 CREAM (GRAM) TOPICAL
Qty: 30 TABLET | Refills: 0 | Status: SHIPPED | OUTPATIENT
Start: 2024-10-30

## 2024-11-25 DIAGNOSIS — M25.511 ACUTE PAIN OF RIGHT SHOULDER: ICD-10-CM

## 2024-11-25 DIAGNOSIS — F10.929 ALCOHOLIC INTOXICATION WITH COMPLICATION: ICD-10-CM

## 2024-11-25 RX ORDER — LANOLIN ALCOHOL/MO/W.PET/CERES
100 CREAM (GRAM) TOPICAL
Qty: 30 TABLET | Refills: 0 | Status: SHIPPED | OUTPATIENT
Start: 2024-11-25

## 2024-11-25 RX ORDER — MELOXICAM 15 MG/1
15 TABLET ORAL
Qty: 30 TABLET | Refills: 0 | Status: SHIPPED | OUTPATIENT
Start: 2024-11-25

## 2024-11-25 NOTE — TELEPHONE ENCOUNTER
No care due was identified.  [facility] Embedded Care Due Messages. Reference number: [id number].   11/25/2024 6:55:29 AM CST

## 2025-01-14 DIAGNOSIS — Z00.00 ENCOUNTER FOR MEDICARE ANNUAL WELLNESS EXAM: ICD-10-CM

## 2025-02-03 DIAGNOSIS — M25.511 ACUTE PAIN OF RIGHT SHOULDER: ICD-10-CM

## 2025-02-03 DIAGNOSIS — F10.929 ALCOHOLIC INTOXICATION WITH COMPLICATION: ICD-10-CM

## 2025-02-03 RX ORDER — MELOXICAM 15 MG/1
15 TABLET ORAL
Qty: 30 TABLET | Refills: 0 | Status: SHIPPED | OUTPATIENT
Start: 2025-02-03

## 2025-02-03 RX ORDER — LANOLIN ALCOHOL/MO/W.PET/CERES
100 CREAM (GRAM) TOPICAL
Qty: 30 TABLET | Refills: 0 | Status: SHIPPED | OUTPATIENT
Start: 2025-02-03

## 2025-02-03 NOTE — TELEPHONE ENCOUNTER
No care due was identified.  [facility] Embedded Care Due Messages. Reference number: [id number].   2/03/2025 7:00:25 AM CST

## 2025-03-09 DIAGNOSIS — M25.511 ACUTE PAIN OF RIGHT SHOULDER: ICD-10-CM

## 2025-03-09 DIAGNOSIS — F10.929 ALCOHOLIC INTOXICATION WITH COMPLICATION: ICD-10-CM

## 2025-03-09 NOTE — TELEPHONE ENCOUNTER
No care due was identified.  [facility] Embedded Care Due Messages. Reference number: [id number].   3/09/2025 7:56:40 AM CDT

## 2025-03-10 RX ORDER — LANOLIN ALCOHOL/MO/W.PET/CERES
100 CREAM (GRAM) TOPICAL
Qty: 30 TABLET | Refills: 0 | Status: SHIPPED | OUTPATIENT
Start: 2025-03-10

## 2025-03-10 RX ORDER — MELOXICAM 15 MG/1
15 TABLET ORAL
Qty: 30 TABLET | Refills: 0 | Status: SHIPPED | OUTPATIENT
Start: 2025-03-10

## 2025-03-18 ENCOUNTER — TELEPHONE (OUTPATIENT)
Dept: ENDOSCOPY | Facility: HOSPITAL | Age: 70
End: 2025-03-18
Payer: OTHER GOVERNMENT

## 2025-03-18 DIAGNOSIS — D09.9 CARCINOMA IN SITU, UNSPECIFIED: Primary | ICD-10-CM

## 2025-03-18 NOTE — TELEPHONE ENCOUNTER
Linda Chambers Juantrell, MA         Previous Messages       ----- Message -----  From: Sylvia Bloom  Sent: 3/18/2025   2:21 PM CDT  To: [facility] Endoscopy Schedulers  Subject: referral                                        Good Evening        [facility] would like to refer the following patient to Dr. Bass in the Gastro department. The patients diagnosis is Carcinoma in situ, unspecified. I have scanned the patients referral and records into [employer].    Requesting: Balloon guided Colon    Thank you,  Sylvia  [facility]

## 2025-03-18 NOTE — TELEPHONE ENCOUNTER
"Adarsh mejia MD Muse, Juantrell, MA; [facility]  Cc: MOI Altamirano Staff  Caller: Unspecified (Today,  2:32 PM)         2025 - Patient Calls: You and Adarsh Bass MD  (Newest Message First)View All Conversations on this Encounter  Adarsh Bass MD to Me  [facility]ul[facility]  Kali Altamirano Staff  (Selected Message)        3/18/25  3:31 PM  Reviewed.  This is for colonoscopy with history of incomplete colonoscopy.  I am not sure if this will require a balloon or not until I do the procedure.  This is a general GI procedure.      Procedure: Colonoscopy, possible device assisted     Diagnosis: Screening colonoscopy     Procedure Timin-12 weeks     *If within 4 weeks selected, please radha as high priority*     *If greater than 12 weeks, please select "5-12 weeks" and delay sending until 3 months prior to requested date*     Location: Hospital Based ([address], [facility], [facility])     Additional Scheduling Information: Previous failed/incomplete colonoscopy; 60-min general GI slot with me; start with a pediatric colonoscope and will need the ultra-slim colonoscope and the balloon enteroscope available.      Prep Specifications:Standard prep     Is the patient taking a GLP-1 Agonist:no     Have you attached a patient to this message: yes     "

## 2025-03-18 NOTE — TELEPHONE ENCOUNTER
"Reviewed.  This is for colonoscopy with history of incomplete colonoscopy.  I am not sure if this will require a balloon or not until I do the procedure.  This is a general GI procedure.      Procedure: Colonoscopy, possible device assisted     Diagnosis: Screening colonoscopy     Procedure Timin-12 weeks     *If within 4 weeks selected, please radha as high priority*     *If greater than 12 weeks, please select "5-12 weeks" and delay sending until 3 months prior to requested date*     Location: Hospital Based ([address], [facility], [facility])     Additional Scheduling Information: Previous failed/incomplete colonoscopy; 60-min general GI slot with me; start with a pediatric colonoscope and will need the ultra-slim colonoscope and the balloon enteroscope available.      Prep Specifications:Standard prep     Is the patient taking a GLP-1 Agonist:no     Have you attached a patient to this message: yes        "

## 2025-03-19 ENCOUNTER — TELEPHONE (OUTPATIENT)
Dept: ENDOSCOPY | Facility: HOSPITAL | Age: 70
End: 2025-03-19
Payer: OTHER GOVERNMENT

## 2025-03-19 VITALS — WEIGHT: 140 LBS | BODY MASS INDEX: 22.5 KG/M2 | HEIGHT: 66 IN

## 2025-03-19 DIAGNOSIS — Z12.11 SCREEN FOR COLON CANCER: Primary | ICD-10-CM

## 2025-03-19 NOTE — TELEPHONE ENCOUNTER
"Adarsh Bass MD to [facility] Schedulers  Kali Altamirano Staff  (Selected Message)        3/18/25  3:31 PM  Reviewed.  This is for colonoscopy with history of incomplete colonoscopy.  I am not sure if this will require a balloon or not until I do the procedure.  This is a general GI procedure.      Procedure: Colonoscopy, possible device assisted     Diagnosis: Screening colonoscopy     Procedure Timin-12 weeks     *If within 4 weeks selected, please radha as high priority*     *If greater than 12 weeks, please select "5-12 weeks" and delay sending until 3 months prior to requested date*     [facility], [facility])     Additional Scheduling Information: Previous failed/incomplete colonoscopy; 60-min general GI slot with me; start with a pediatric colonoscope and will need the ultra-slim colonoscope and the balloon enteroscope available.      Prep Specifications:Standard prep     Is the patient taking a GLP-1 Agonist:no     Have you attached a patient to this message: yes    "

## 2025-03-19 NOTE — TELEPHONE ENCOUNTER
IMPORTANT INFORMATION TO KNOW BEFORE YOUR PROCEDURE    [facility] [place] 2nd Floor         If your procedure requires the administration of anesthesia, it is necessary for a responsible adult to drive you home. (Medical Transportation, Uber, Lyft, Taxi, etc. may ONLY be used if a responsible adult is present to accompany you home.  The responsible adult CAN'T be the  of the service).      person must be available to return to pick you up within 15 minutes of being notified of discharge.       Please bring a picture ID, insurance card, & copayment      Take Medications as directed below:    If you are taking any injectable medication (s) for weight loss and/or diabetes  , hold , please stop taking }on . After the procedure, your provider will inform you  of when to resume injection.    If you are taking the oral medication Adipex (Phentermine), hold 4 days prior to your procedure, please stop taking on .       If you are taking the oral medication(s):   Invokana (Canagliflozin), Farxiga (Dapagliflozin), Jardiance (Empagliflozin), Invokamet/Invokamet XR (invokana +metformin), Xigduo (farxiga + metformin), Synjardy XR (jardiance + metformin), hold 3 days prior to your procedure, please stop taking on .     1) Stop taking   (prior to the procedure) on:      2) Stop taking   (prior to the procedure) on:      If you begin taking any blood thinning medications, injectable weight loss/diabetes medications (other than insulin) , or Adipex (Phentermine) please contact the endoscopy scheduling department listed below as soon as possible.    If you are diabetic see the attached instruction sheet regarding your medication.     If you take HEART, BLOOD PRESSURE, SEIZURE, PAIN, LUNG (including inhalers/nebulizers), ANTI-REJECTION (transplant patients), or PSYCHIATRIC medications, please take at your regular times with a sip of water or as directed by the scheduling nurse.     Important contact  information:    Endoscopy [phone] Hours of operation Monday-Friday 8:00-4:30pm.    Questions about insurance or financial obligations call [phone] or [phone].    If you have questions regarding the prep or need to reschedule, please call [phone]. After hours questions requiring immediate assistance, contact [facility] nurse line at [phone] or [phone].   NOTE:     On occasion, unforeseen circumstances may cause a delay in your procedure start time. We respect your time and appreciate your patience during these circumstances.      Comments:              Colonoscopy Procedure Prep Instructions    Date of procedure: 5/19/25 Arrive at: 10:00 AM    Location of Department:   [facility] [address] [place] [address]  Take the [facility] Elev[facility] to 2nd Floor Outpatient Surgery    As soon as possible:   your prep from pharmacy and over the counter DULCOLAX LAXATIVE TABLETS            On the day before your procedure   What You CAN do:   You may have clear liquids ONLY-see below for list.     Liquids That Are OK to Drink:   Water  Sports drinks (Gatorade, Power-Aid)  Coffee or tea (no cream or nondairy creamer)  Clear juices without pulp (apple, white grape)  Gelatin desserts (no fruit or toppings)  Clear soda (sprite, coke, ginger ale)  Chicken broth (until 12 midnight the night before procedure)    What You CANNOT do:   Do not EAT solid food, drink milk or anything   colored red.  Do not drink alcohol.  Do not take oral medications within 1 hour of starting   each dose of prep.  No gum chewing or candy morning of procedure                       Note:   (Please disregard the insert instructions from pharmacy).  PEG Bowel Prep is indicated for cleansing of the colon as a preparation for colonoscopy in adults.   Be sure to tell your doctor about all the medicines you take, including prescription and non-prescription medicines, vitamins, and  herbal supplements. PEG Bowel Prep may affect how other medicines work.  Medication taken by mouth may not be absorbed properly when taken within 1 hour before the start of each dose of PEG Bowel Prep.    It is not uncommon to experience some abdominal cramping, nausea and/or vomiting when taking the prep. If you have nausea and/or vomiting while taking the prep, stop drinking for 20 to 30 minutes then continue.      How to take prep:    PEG Bowel Prep is a (2-day) prep.    One (1) bottle of prep are required for a complete preparation for colonoscopy. Dilute the solution concentrate as directed prior to use. You must drink water with each dose of prep, and additional water after each dose.    DOSE 1--Day Before Colonoscopy 5/18/25     Drink at least 6 to 8 glasses of clear liquids from time you wake up until you begin your prep and then continue until bedtime to avoid dehydration.     12:00 pm (NOON) Mix your entire container of prep with lukewarm water and refrigerate. Take four (4) Dulcolax (Bisacodyl) tablets with at least 8 ounces or more of clear liquids.       6:00 pm:    You must complete Steps 1 and 2 below before going to bed:    Step 1-Drink half the liquid in the container within one (1) hour.   Step 2-Refrigerate the remaining half of the liquid for dose 2. See below when to begin this step.                       IMPORTANT: If you experience preparation-related symptoms (for example, nausea, bloating, or cramping), stop, or slow the rate of drinking the additional water until your symptoms decrease.    DOSE 2--Day of the Colonoscopy 5/19/20 at 2-3 AM.    For this dose, repeat Step 1 shown above using the remaining half of the liquid prep.   You may continue drinking water/clear liquids until   4 hours before your colonoscopy or as directed by the scheduling nurse  7:00 AM.      For information about your procedure, two (2) things to view prior to colonoscopy:  Please watch this informational video. It  is important to watch this animated consent video prior to your arrival. If you haven't watched the video prior to arriving, you are required to watch it during admission which can causes delays.    Options for viewing:   Using a keyboard:  press and hold the control tab (Ctrl) and left mouse click to follow links.           Colonoscopy Instructional Video                                                                                   OR    Type link address into your web browser's address bar:  [email or web address]      Educational Booklet with pictures:      Colonoscopy Prep - Liquid      Comments:

## 2025-05-06 ENCOUNTER — TELEPHONE (OUTPATIENT)
Dept: ENDOSCOPY | Facility: HOSPITAL | Age: 70
End: 2025-05-06
Payer: OTHER GOVERNMENT

## 2025-05-06 NOTE — TELEPHONE ENCOUNTER
IMPORTANT INFORMATION TO KNOW BEFORE YOUR PROCEDURE     [facility] [place] 2nd Floor            If your procedure requires the administration of anesthesia, it is necessary for a responsible adult to drive you home. (Medical Transportation, Uber, Lyft, Taxi, etc. may ONLY be used if a responsible adult is present to accompany you home.  The responsible adult CAN'T be the  of the service).       person must be available to return to pick you up within 15 minutes of being notified of discharge.         Please bring a picture ID, insurance card, & copayment        Take Medications as directed below:          If you begin taking any blood thinning medications, injectable weight loss/diabetes medications (other than insulin) , or Adipex (Phentermine) please contact the endoscopy scheduling department listed below as soon as possible.     If you are diabetic see the attached instruction sheet regarding your medication.      If you take HEART, BLOOD PRESSURE, SEIZURE, PAIN, LUNG (including inhalers/nebulizers), ANTI-REJECTION (transplant patients), or PSYCHIATRIC medications, please take at your regular times with a sip of water or as directed by the scheduling nurse.      Important contact information:     Endoscopy [phone] Hours of operation Monday-Friday 8:00-4:30pm.     Questions about insurance or financial obligations call [phone] or [phone].     If you have questions regarding the prep or need to reschedule, please call [phone]. After hours questions requiring immediate assistance, contact [facility] nurse line at [phone] or [phone].   NOTE:      On occasion, unforeseen circumstances may cause a delay in your procedure start time. We respect your time and appreciate your patience during these circumstances.            Comments:                   Colonoscopy Procedure Prep Instructions     Date of procedure: 7/2/25 Arrive at: 279  AM     Location of Department:   [facility] [address] [place] [address]  Take the [facility] Ele[facility] to 2nd Floor Outpatient Surgery     As soon as possible:   your prep from pharmacy and over the counter DULCOLAX LAXATIVE TABLETS             On the day before your procedure   What You CAN do:   You may have clear liquids ONLY-see below for list.      Liquids That Are OK to Drink:   Water  Sports drinks (Gatorade, Power-Aid)  Coffee or tea (no cream or nondairy creamer)  Clear juices without pulp (apple, white grape)  Gelatin desserts (no fruit or toppings)  Clear soda (sprite, coke, ginger ale)  Chicken broth (until 12 midnight the night before procedure)     What You CANNOT do:   Do not EAT solid food, drink milk or anything   colored red.  Do not drink alcohol.  Do not take oral medications within 1 hour of starting   each dose of prep.  No gum chewing or candy morning of procedure                       Note:   (Please disregard the insert instructions from pharmacy).  PEG Bowel Prep is indicated for cleansing of the colon as a preparation for colonoscopy in adults.   Be sure to tell your doctor about all the medicines you take, including prescription and non-prescription medicines, vitamins, and herbal supplements. PEG Bowel Prep may affect how other medicines work.  Medication taken by mouth may not be absorbed properly when taken within 1 hour before the start of each dose of PEG Bowel Prep.     It is not uncommon to experience some abdominal cramping, nausea and/or vomiting when taking the prep. If you have nausea and/or vomiting while taking the prep, stop drinking for 20 to 30 minutes then continue.        How to take prep:     PEG Bowel Prep is a (2-day) prep.    One (1) bottle of prep are required for a complete preparation for colonoscopy. Dilute the solution concentrate as directed prior to use. You must drink water with each dose of prep, and additional water after  each dose.     DOSE 1--Day Before Colonoscopy 7/1/25      Drink at least 6 to 8 glasses of clear liquids from time you wake up until you begin your prep and then continue until bedtime to avoid dehydration.      12:00 pm (NOON) Mix your entire container of prep with lukewarm water and refrigerate. Take four (4) Dulcolax (Bisacodyl) tablets with at least 8 ounces or more of clear liquids.        6:00 pm:     You must complete Steps 1 and 2 below before going to bed:     Step 1-Drink half the liquid in the container within one (1) hour.   Step 2-Refrigerate the remaining half of the liquid for dose 2. See below when to begin this step.                       IMPORTANT: If you experience preparation-related symptoms (for example, nausea, bloating, or cramping), stop, or slow the rate of drinking the additional water until your symptoms decrease.     DOSE 2--Day of the Colonoscopy 7/2/25 at 2-3 AM.     For this dose, repeat Step 1 shown above using the remaining half of the liquid prep.   You may continue drinking water/clear liquids until   4 hours before your colonoscopy or as directed by the scheduling nurse  415 AM.        For information about your procedure, two (2) things to view prior to colonoscopy:  Please watch this informational video. It is important to watch this animated consent video prior to your arrival. If you haven't watched the video prior to arriving, you are required to watch it during admission which can causes delays.     Options for viewing:   Using a keyboard:  press and hold the control tab (Ctrl) and left mouse click to follow links.            Colonoscopy Instructional Video                                                                                   OR     Type link address into your web browser's address bar:  [email or web address]/[email or web address]        Educational Booklet with pictures:        Colonoscopy Prep - Liquid

## 2025-05-06 NOTE — TELEPHONE ENCOUNTER
"[facility]      Spoke to wife to schedule procedure(s) Colonoscopy       Physician to perform procedure(s) Dr. MARIA ESTHER Bass  Date of Procedure (s) 25  Arrival Time 7:15 AM  Time of Procedure(s) 8:15 AM   Location of Procedure(s) [place] 2nd Floor  Type of Rx Prep sent to patient: PEG  Instructions provided to patient via Postal Mail    Patient was informed on the following information and verbalized understanding. Screening questionnaire reviewed with patient and complete. If procedure requires anesthesia, a responsible adult needs to be present to accompany the patient home, patient cannot drive after receiving anesthesia. Appointment details are tentative, especially check-in time. Patient will receive a prep-op call 7 days prior to confirm check-in time for procedure. If applicable the patient should contact their pharmacy to verify Rx for procedure prep is ready for pick-up. Patient was advised to call the scheduling department at [phone] if pharmacy states no Rx is available. Patient was advised to call the endoscopy scheduling department if any questions or concerns arise.       Endoscopy Scheduling Department    Adarsh Bass MD to Me  [facility] Schedulers  Kali Altamirano Staff  (Selected Message)          3/18/25  3:31 PM  Reviewed.  This is for colonoscopy with history of incomplete colonoscopy.  I am not sure if this will require a balloon or not until I do the procedure.  This is a general GI procedure.      Procedure: Colonoscopy, possible device assisted     Diagnosis: Screening colonoscopy     Procedure Timin-12 weeks     *If within 4 weeks selected, please radha as high priority*     *If greater than 12 weeks, please select "5-12 weeks" and delay sending until 3 months prior to requested date*     Location: Hospital Based ([facility],  endo, [facility])     Additional Scheduling Information: Previous failed/incomplete colonoscopy; " 60-min general GI slot with me; start with a pediatric colonoscope and will need the ultra-slim colonoscope and the balloon enteroscope available.      Prep Specifications:Standard prep     Is the patient taking a GLP-1 Agonist:no     Have you attached a patient to this message: yes

## 2025-07-02 ENCOUNTER — PATIENT MESSAGE (OUTPATIENT)
Dept: ENDOSCOPY | Facility: HOSPITAL | Age: 70
End: 2025-07-02
Payer: OTHER GOVERNMENT

## 2025-07-02 ENCOUNTER — TELEPHONE (OUTPATIENT)
Dept: ENDOSCOPY | Facility: HOSPITAL | Age: 70
End: 2025-07-02
Payer: OTHER GOVERNMENT

## 2025-07-02 NOTE — TELEPHONE ENCOUNTER
"Patient is scheduled for a Colonoscopy on 25 with Dr. MARIA ESTHER Bass  [facility]    Reviewed.  This is for colonoscopy with history of incomplete colonoscopy.  I am not sure if this will require a balloon or not until I do the procedure.  This is a general GI procedure.      Procedure: Colonoscopy, possible device assisted     Diagnosis: Screening colonoscopy     Procedure Timin-12 weeks     *If within 4 weeks selected, please radha as high priority*     *If greater than 12 weeks, please select "5-12 weeks" and delay sending until 3 months prior to requested date*     Location: Hospital Based ([facility], [facility], [facility])     Additional Scheduling Information: Previous failed/incomplete colonoscopy; 60-min general GI slot with me; start with a pediatric colonoscope and will need the ultra-slim colonoscope and the balloon enteroscope available.      Prep Specifications:Standard prep     Is the patient taking a GLP-1 Agonist:no     Have you attached a patient to this message: yes                  "

## 2025-08-18 ENCOUNTER — TELEPHONE (OUTPATIENT)
Dept: ENDOSCOPY | Facility: HOSPITAL | Age: 70
End: 2025-08-18
Payer: OTHER GOVERNMENT